# Patient Record
Sex: MALE | Race: OTHER | NOT HISPANIC OR LATINO | Employment: FULL TIME | URBAN - METROPOLITAN AREA
[De-identification: names, ages, dates, MRNs, and addresses within clinical notes are randomized per-mention and may not be internally consistent; named-entity substitution may affect disease eponyms.]

---

## 2024-07-26 ENCOUNTER — HOSPITAL ENCOUNTER (OUTPATIENT)
Dept: RADIOLOGY | Facility: CLINIC | Age: 44
Discharge: HOME | End: 2024-07-26
Payer: COMMERCIAL

## 2024-07-26 DIAGNOSIS — S46.119A RUPTURE OF MUSCLE OF LONG HEAD OF BICEPS: ICD-10-CM

## 2024-07-26 PROCEDURE — 73080 X-RAY EXAM OF ELBOW: CPT | Mod: RT

## 2024-10-17 ENCOUNTER — HOSPITAL ENCOUNTER (OUTPATIENT)
Dept: RADIOLOGY | Facility: CLINIC | Age: 44
Discharge: HOME | End: 2024-10-17
Payer: COMMERCIAL

## 2024-10-17 DIAGNOSIS — S46.211A STRAIN OF MUSCLE, FASCIA AND TENDON OF OTHER PARTS OF BICEPS, RIGHT ARM, INITIAL ENCOUNTER: ICD-10-CM

## 2024-10-17 PROCEDURE — 73221 MRI JOINT UPR EXTREM W/O DYE: CPT | Mod: RT

## 2024-10-29 ENCOUNTER — APPOINTMENT (OUTPATIENT)
Dept: PHYSICAL THERAPY | Facility: CLINIC | Age: 44
End: 2024-10-29

## 2024-11-14 ENCOUNTER — LAB (OUTPATIENT)
Dept: LAB | Facility: LAB | Age: 44
End: 2024-11-14
Payer: COMMERCIAL

## 2024-11-14 ENCOUNTER — PRE-ADMISSION TESTING (OUTPATIENT)
Dept: PREADMISSION TESTING | Facility: HOSPITAL | Age: 44
End: 2024-11-14
Payer: COMMERCIAL

## 2024-11-14 VITALS
BODY MASS INDEX: 32.99 KG/M2 | OXYGEN SATURATION: 100 % | TEMPERATURE: 98.6 F | HEART RATE: 102 BPM | HEIGHT: 66 IN | DIASTOLIC BLOOD PRESSURE: 106 MMHG | SYSTOLIC BLOOD PRESSURE: 157 MMHG | WEIGHT: 205.3 LBS

## 2024-11-14 DIAGNOSIS — Z01.818 PRE-OP EXAMINATION: Primary | ICD-10-CM

## 2024-11-14 DIAGNOSIS — Z01.818 PRE-OP EXAMINATION: ICD-10-CM

## 2024-11-14 LAB
ANION GAP SERPL CALCULATED.3IONS-SCNC: 15 MMOL/L (ref 10–20)
BASOPHILS # BLD AUTO: 0.09 X10*3/UL (ref 0–0.1)
BASOPHILS NFR BLD AUTO: 0.7 %
BUN SERPL-MCNC: 10 MG/DL (ref 6–23)
CALCIUM SERPL-MCNC: 9.9 MG/DL (ref 8.6–10.3)
CHLORIDE SERPL-SCNC: 94 MMOL/L (ref 98–107)
CO2 SERPL-SCNC: 25 MMOL/L (ref 21–32)
CREAT SERPL-MCNC: 1.15 MG/DL (ref 0.5–1.3)
EGFRCR SERPLBLD CKD-EPI 2021: 79 ML/MIN/1.73M*2
EOSINOPHIL # BLD AUTO: 0.12 X10*3/UL (ref 0–0.7)
EOSINOPHIL NFR BLD AUTO: 1 %
ERYTHROCYTE [DISTWIDTH] IN BLOOD BY AUTOMATED COUNT: 11.9 % (ref 11.5–14.5)
GLUCOSE SERPL-MCNC: 193 MG/DL (ref 74–99)
HCT VFR BLD AUTO: 42.1 % (ref 41–52)
HGB BLD-MCNC: 14.5 G/DL (ref 13.5–17.5)
IMM GRANULOCYTES # BLD AUTO: 0.07 X10*3/UL (ref 0–0.7)
IMM GRANULOCYTES NFR BLD AUTO: 0.6 % (ref 0–0.9)
LYMPHOCYTES # BLD AUTO: 1.59 X10*3/UL (ref 1.2–4.8)
LYMPHOCYTES NFR BLD AUTO: 12.6 %
MCH RBC QN AUTO: 31.9 PG (ref 26–34)
MCHC RBC AUTO-ENTMCNC: 34.4 G/DL (ref 32–36)
MCV RBC AUTO: 93 FL (ref 80–100)
MONOCYTES # BLD AUTO: 0.63 X10*3/UL (ref 0.1–1)
MONOCYTES NFR BLD AUTO: 5 %
NEUTROPHILS # BLD AUTO: 10.12 X10*3/UL (ref 1.2–7.7)
NEUTROPHILS NFR BLD AUTO: 80.1 %
NRBC BLD-RTO: 0 /100 WBCS (ref 0–0)
PLATELET # BLD AUTO: 333 X10*3/UL (ref 150–450)
POTASSIUM SERPL-SCNC: 4.1 MMOL/L (ref 3.5–5.3)
RBC # BLD AUTO: 4.54 X10*6/UL (ref 4.5–5.9)
SODIUM SERPL-SCNC: 130 MMOL/L (ref 136–145)
WBC # BLD AUTO: 12.6 X10*3/UL (ref 4.4–11.3)

## 2024-11-14 PROCEDURE — 87081 CULTURE SCREEN ONLY: CPT | Mod: WESLAB

## 2024-11-14 PROCEDURE — 99203 OFFICE O/P NEW LOW 30 MIN: CPT

## 2024-11-14 RX ORDER — METFORMIN HYDROCHLORIDE 500 MG/1
500 TABLET ORAL
COMMUNITY

## 2024-11-14 RX ORDER — LATANOPROST 50 UG/ML
1 SOLUTION/ DROPS OPHTHALMIC NIGHTLY
COMMUNITY

## 2024-11-14 RX ORDER — CHLORHEXIDINE GLUCONATE ORAL RINSE 1.2 MG/ML
SOLUTION DENTAL
Qty: 473 ML | Refills: 0 | Status: SHIPPED | OUTPATIENT
Start: 2024-11-14 | End: 2024-11-27

## 2024-11-14 RX ORDER — OMEPRAZOLE 20 MG/1
20 TABLET, DELAYED RELEASE ORAL
COMMUNITY

## 2024-11-14 RX ORDER — ALLOPURINOL 300 MG/1
300 TABLET ORAL
COMMUNITY

## 2024-11-14 RX ORDER — AMLODIPINE BESYLATE 5 MG/1
5 TABLET ORAL
COMMUNITY

## 2024-11-14 ASSESSMENT — DUKE ACTIVITY SCORE INDEX (DASI)
CAN YOU PARTICIPATE IN MODERATE RECREATIONAL ACTIVITIES LIKE GOLF, BOWLING, DANCING, DOUBLES TENNIS OR THROWING A BASEBALL OR FOOTBALL: NO
CAN YOU WALK A BLOCK OR TWO ON LEVEL GROUND: YES
CAN YOU DO MODERATE WORK AROUND THE HOUSE LIKE VACUUMING, SWEEPING FLOORS OR CARRYING GROCERIES: YES
CAN YOU DO HEAVY WORK AROUND THE HOUSE LIKE SCRUBBING FLOORS OR LIFTING AND MOVING HEAVY FURNITURE: NO
CAN YOU TAKE CARE OF YOURSELF (EAT, DRESS, BATHE, OR USE TOILET): YES
DASI METS SCORE: 6.7
CAN YOU HAVE SEXUAL RELATIONS: YES
TOTAL_SCORE: 32.2
CAN YOU DO LIGHT WORK AROUND THE HOUSE LIKE DUSTING OR WASHING DISHES: YES
CAN YOU CLIMB A FLIGHT OF STAIRS OR WALK UP A HILL: YES
CAN YOU DO YARD WORK LIKE RAKING LEAVES, WEEDING OR PUSHING A MOWER: NO
CAN YOU WALK INDOORS, SUCH AS AROUND YOUR HOUSE: YES
CAN YOU PARTICIPATE IN STRENOUS SPORTS LIKE SWIMMING, SINGLES TENNIS, FOOTBALL, BASKETBALL, OR SKIING: NO
CAN YOU RUN A SHORT DISTANCE: YES

## 2024-11-14 ASSESSMENT — ENCOUNTER SYMPTOMS
MUSCULOSKELETAL NEGATIVE: 1
ENDOCRINE NEGATIVE: 1
CONSTITUTIONAL NEGATIVE: 1
NUMBNESS: 1
RESPIRATORY NEGATIVE: 1
CARDIOVASCULAR NEGATIVE: 1
HEMATOLOGIC/LYMPHATIC NEGATIVE: 1
EYES NEGATIVE: 1
PSYCHIATRIC NEGATIVE: 1
ALLERGIC/IMMUNOLOGIC NEGATIVE: 1
GASTROINTESTINAL NEGATIVE: 1

## 2024-11-14 ASSESSMENT — PAIN SCALES - GENERAL: PAINLEVEL_OUTOF10: 0 - NO PAIN

## 2024-11-14 ASSESSMENT — PAIN - FUNCTIONAL ASSESSMENT: PAIN_FUNCTIONAL_ASSESSMENT: 0-10

## 2024-11-14 NOTE — H&P (VIEW-ONLY)
CPM/PAT Evaluation       Name: Gabriel Castro (Gabriel Castro)  /Age: 1980/44 y.o.     In-Person       Chief Complaint: Right distal bicep tear    HPI: Gabriel Castro is a 44 year old male that has a history of a right distal bicep tear from lifting a heavy box at work. He had his bicep repaired this past August but the surgeon told him his tendon was very shredded and a repeat procedure was necessary. He denies pain or swelling in the right arm. He denies having to take medication for pain.  He states he has some numbness in the forearm after his last procedure. He denies fever, chills, nausea, vomiting, chest pain, sob, dizziness, and palpitations. He is scheduled for a right bicep tendon repair with graft.     Past Medical History:   Diagnosis Date    Arthritis 2010    Gout talke allopurinol 400mg    GERD (gastroesophageal reflux disease) 2019    Take medecine omeprazole    Glaucoma 2018    Use daily eye drops    Hypertension 2017    Just a little high use 5 mg pills    Joint pain 2010    Gout    Type 2 diabetes mellitus 2022    Pre diabetic take metformin       Past Surgical History:   Procedure Laterality Date    OTHER SURGICAL HISTORY  2024    Right bicep tendon repair     Social History     Tobacco Use    Smoking status: Never    Smokeless tobacco: Never   Substance Use Topics    Alcohol use: Yes     Alcohol/week: 8.0 standard drinks of alcohol     Types: 8 Cans of beer per week     Comment: Every other day     Social History     Substance and Sexual Activity   Drug Use Never       Patient  reports that he is not currently sexually active and has had partner(s) who are female. He reports using the following method of birth control/protection: None.    No family history on file.    No Known Allergies  Current Outpatient Medications   Medication Sig Dispense Refill    allopurinol (Zyloprim) 300 mg tablet Take 1 tablet (300 mg) by mouth once daily.      amLODIPine  (Norvasc) 5 mg tablet Take 1 tablet (5 mg) by mouth once daily.      chlorhexidine (Peridex) 0.12 % solution Use as directed 473 mL 0    latanoprost (Xalatan) 0.005 % ophthalmic solution Administer 1 drop into both eyes once daily at bedtime.      metFORMIN (Glucophage) 500 mg tablet Take 1 tablet (500 mg) by mouth once daily.      omeprazole OTC (PriLOSEC OTC) 20 mg EC tablet Take 1 tablet (20 mg) by mouth once daily in the morning. Take before meals.       No current facility-administered medications for this visit.       Review of Systems   Constitutional: Negative.    HENT: Negative.     Eyes: Negative.    Respiratory: Negative.     Cardiovascular: Negative.    Gastrointestinal: Negative.    Endocrine: Negative.    Genitourinary: Negative.    Musculoskeletal: Negative.    Skin: Negative.    Allergic/Immunologic: Negative.    Neurological:  Positive for numbness (tingling lower right arm).   Hematological: Negative.    Psychiatric/Behavioral: Negative.             Physical Exam  Vitals reviewed.   Constitutional:       Appearance: Normal appearance.   HENT:      Head: Normocephalic and atraumatic.      Nose: Nose normal.      Mouth/Throat:      Mouth: Mucous membranes are moist.      Pharynx: Oropharynx is clear.   Eyes:      Extraocular Movements: Extraocular movements intact.      Conjunctiva/sclera: Conjunctivae normal.      Pupils: Pupils are equal, round, and reactive to light.   Cardiovascular:      Rate and Rhythm: Normal rate and regular rhythm.   Pulmonary:      Effort: Pulmonary effort is normal.      Breath sounds: Normal breath sounds.   Abdominal:      General: Bowel sounds are normal.      Palpations: Abdomen is soft.   Genitourinary:     Comments: Assessment deferred to physician    Musculoskeletal:         General: Normal range of motion.      Cervical back: Normal range of motion and neck supple.   Skin:     General: Skin is warm and dry.   Neurological:      General: No focal deficit present.     "  Mental Status: He is alert and oriented to person, place, and time.   Psychiatric:         Mood and Affect: Mood normal.         Behavior: Behavior normal.         Thought Content: Thought content normal.         Judgment: Judgment normal.          PAT AIRWAY:   Airway:     Mallampati::  II    TM distance::  >3 FB    Neck ROM::  Full  normal            BP (!) 157/106   Pulse 102 Comment: recheck  Temp 37 °C (98.6 °F) (Temporal)   Ht 1.676 m (5' 6\")   Wt 93.1 kg (205 lb 4.8 oz)   SpO2 100%   BMI 33.14 kg/m²       ASA: II  WALI: 2.8  RCRI 0.4%  DASI Risk Score      Flowsheet Row Pre-Admission Testing from 11/14/2024 in [facility] Questionnaire Series Submission from 11/4/2024 in [facility] with Generic Provider Tash   Can you take care of yourself (eat, dress, bathe, or use toilet)?  2.75 filed at 11/14/2024 1300 2.75  filed at 11/04/2024 1601   Can you walk indoors, such as around your house? 1.75 filed at 11/14/2024 1300 1.75  filed at 11/04/2024 1601   Can you walk a block or two on level ground?  2.75 filed at 11/14/2024 1300 2.75  filed at 11/04/2024 1601   Can you climb a flight of stairs or walk up a hill? 5.5 filed at 11/14/2024 1300 5.5  filed at 11/04/2024 1601   Can you run a short distance? 8 filed at 11/14/2024 1300 8  filed at 11/04/2024 1601   Can you do light work around the house like dusting or washing dishes? 2.7 filed at 11/14/2024 1300 2.7  filed at 11/04/2024 1601   Can you do moderate work around the house like vacuuming, sweeping floors or carrying groceries? 3.5 filed at 11/14/2024 1300 3.5  filed at 11/04/2024 1601   Can you do heavy work around the house like scrubbing floors or lifting and moving heavy furniture?  0 filed at 11/14/2024 1300 0  filed at 11/04/2024 1601   Can you do yard work like raking leaves, weeding or pushing a mower? 0 filed at 11/14/2024 1300 0  filed at 11/04/2024 1601   Can you have sexual relations? 5.25 filed at 11/14/2024 1300 5.25  " filed at 11/04/2024 1601   Can you participate in moderate recreational activities like golf, bowling, dancing, doubles tennis or throwing a baseball or football? 0 filed at 11/14/2024 1300 0  filed at 11/04/2024 1601   Can you participate in strenous sports like swimming, singles tennis, football, basketball, or skiing? 0 filed at 11/14/2024 1300 0  filed at 11/04/2024 1601   DASI SCORE 32.2 filed at 11/14/2024 1300 32.2  filed at 11/04/2024 1601   METS Score (Will be calculated only when all the questions are answered) 6.7 filed at 11/14/2024 1300 6.7  filed at 11/04/2024 1601          Caprini DVT Assessment    No data to display       Modified Frailty Index    No data to display       CHADS2 Stroke Risk  Current as of 32 minutes ago        N/A 3 to 100%: High Risk   2 to < 3%: Medium Risk   0 to < 2%: Low Risk     Last Change: N/A          This score determines the patient's risk of having a stroke if the patient has atrial fibrillation.        This score is not applicable to this patient. Components are not calculated.          Revised Cardiac Risk Index      Flowsheet Row Pre-Admission Testing from 11/14/2024 in [facility]   High-Risk Surgery (Intraperitoneal, Intrathoracic,Suprainguinal vascular) 0 filed at 11/14/2024 1322   History of ischemic heart disease (History of MI, History of positive exercuse test, Current chest paint considered due to myocardial ischemia, Use of nitrate therapy, ECG with pathological Q Waves) 0 filed at 11/14/2024 1322   History of congestive heart failure (pulmonary edemia, bilateral rales or S3 gallop, Paroxysmal nocturnal dyspnea, CXR showing pulmonary vascular redistribution) 0 filed at 11/14/2024 1322   History of cerebrovascular disease (Prior TIA or stroke) 0 filed at 11/14/2024 1322   Pre-operative insulin treatment 0 filed at 11/14/2024 1322   Pre-operative creatinine>2 mg/dl 0 filed at 11/14/2024 1322   Revised Cardiac Risk Calculator 0 filed at 11/14/2024  1322          Apfel Simplified Score    No data to display       Risk Analysis Index Results This Encounter    No data found in the last 10 encounters.       Stop Bang Score      Flowsheet Row Pre-Admission Testing from 11/14/2024 in [facility] Questionnaire Series Submission from 11/4/2024 in [facility] with Generic Provider Tash   Do you snore loudly? 1 filed at 11/14/2024 1301 1  filed at 11/04/2024 1601   Do you often feel tired or fatigued after your sleep? 0 filed at 11/14/2024 1301 0  filed at 11/04/2024 1601   Has anyone ever observed you stop breathing in your sleep? 0 filed at 11/14/2024 1301 0  filed at 11/04/2024 1601   Do you have or are you being treated for high blood pressure? 1 filed at 11/14/2024 1301 1  filed at 11/04/2024 1601   Recent BMI (Calculated) 33.2 filed at 11/14/2024 1301 33.9  filed at 11/04/2024 1601   Is BMI greater than 35 kg/m2? 0=No filed at 11/14/2024 1301 0=No  filed at 11/04/2024 1601   Age older than 50 years old? 0=No filed at 11/14/2024 1301 0=No  filed at 11/04/2024 1601   Is your neck circumference greater than 17 inches (Male) or 16 inches (Female)? 1 filed at 11/14/2024 1301 --   Gender - Male 1=Yes filed at 11/14/2024 1301 1=Yes  filed at 11/04/2024 1601   STOP-BANG Total Score 4 filed at 11/14/2024 1301 --          Prodigy: High Risk  Total Score: 8              Prodigy Gender Score          ARISCAT Score for Postoperative Pulmonary Complications    No data to display       Avilez Perioperative Risk for Myocardial Infarction or Cardiac Arrest (RASHAWN)    No data to display         Assessment and Plan:     Rupture of right distal biceps tendon, initial encounter : Repair Tendon with or without Tendon Graft Upper Extremity   Hypertension  Gout  GERD  ETOH: 30 beers a week  BMI: 33.14    LABS: MRSA, CBC, BMP collected in [facility]

## 2024-11-14 NOTE — CPM/PAT H&P
CPM/PAT Evaluation       Name: Gabriel Castro (Gabriel Castro)  /Age: 1980/44 y.o.     In-Person       Chief Complaint: Right distal bicep tear    HPI: Gabriel Castro is a 44 year old male that has a history of a right distal bicep tear from lifting a heavy box at work. He had his bicep repaired this past August but the surgeon told him his tendon was very shredded and a repeat procedure was necessary. He denies pain or swelling in the right arm. He denies having to take medication for pain.  He states he has some numbness in the forearm after his last procedure. He denies fever, chills, nausea, vomiting, chest pain, sob, dizziness, and palpitations. He is scheduled for a right bicep tendon repair with graft.     Past Medical History:   Diagnosis Date    Arthritis 2010    Gout talke allopurinol 400mg    GERD (gastroesophageal reflux disease) 2019    Take medecine omeprazole    Glaucoma 2018    Use daily eye drops    Hypertension 2017    Just a little high use 5 mg pills    Joint pain 2010    Gout    Type 2 diabetes mellitus 2022    Pre diabetic take metformin       Past Surgical History:   Procedure Laterality Date    OTHER SURGICAL HISTORY  2024    Right bicep tendon repair     Social History     Tobacco Use    Smoking status: Never    Smokeless tobacco: Never   Substance Use Topics    Alcohol use: Yes     Alcohol/week: 8.0 standard drinks of alcohol     Types: 8 Cans of beer per week     Comment: Every other day     Social History     Substance and Sexual Activity   Drug Use Never       Patient  reports that he is not currently sexually active and has had partner(s) who are female. He reports using the following method of birth control/protection: None.    No family history on file.    No Known Allergies  Current Outpatient Medications   Medication Sig Dispense Refill    allopurinol (Zyloprim) 300 mg tablet Take 1 tablet (300 mg) by mouth once daily.      amLODIPine  (Norvasc) 5 mg tablet Take 1 tablet (5 mg) by mouth once daily.      chlorhexidine (Peridex) 0.12 % solution Use as directed 473 mL 0    latanoprost (Xalatan) 0.005 % ophthalmic solution Administer 1 drop into both eyes once daily at bedtime.      metFORMIN (Glucophage) 500 mg tablet Take 1 tablet (500 mg) by mouth once daily.      omeprazole OTC (PriLOSEC OTC) 20 mg EC tablet Take 1 tablet (20 mg) by mouth once daily in the morning. Take before meals.       No current facility-administered medications for this visit.       Review of Systems   Constitutional: Negative.    HENT: Negative.     Eyes: Negative.    Respiratory: Negative.     Cardiovascular: Negative.    Gastrointestinal: Negative.    Endocrine: Negative.    Genitourinary: Negative.    Musculoskeletal: Negative.    Skin: Negative.    Allergic/Immunologic: Negative.    Neurological:  Positive for numbness (tingling lower right arm).   Hematological: Negative.    Psychiatric/Behavioral: Negative.             Physical Exam  Vitals reviewed.   Constitutional:       Appearance: Normal appearance.   HENT:      Head: Normocephalic and atraumatic.      Nose: Nose normal.      Mouth/Throat:      Mouth: Mucous membranes are moist.      Pharynx: Oropharynx is clear.   Eyes:      Extraocular Movements: Extraocular movements intact.      Conjunctiva/sclera: Conjunctivae normal.      Pupils: Pupils are equal, round, and reactive to light.   Cardiovascular:      Rate and Rhythm: Normal rate and regular rhythm.   Pulmonary:      Effort: Pulmonary effort is normal.      Breath sounds: Normal breath sounds.   Abdominal:      General: Bowel sounds are normal.      Palpations: Abdomen is soft.   Genitourinary:     Comments: Assessment deferred to physician    Musculoskeletal:         General: Normal range of motion.      Cervical back: Normal range of motion and neck supple.   Skin:     General: Skin is warm and dry.   Neurological:      General: No focal deficit present.     "  Mental Status: He is alert and oriented to person, place, and time.   Psychiatric:         Mood and Affect: Mood normal.         Behavior: Behavior normal.         Thought Content: Thought content normal.         Judgment: Judgment normal.          PAT AIRWAY:   Airway:     Mallampati::  II    TM distance::  >3 FB    Neck ROM::  Full  normal            BP (!) 157/106   Pulse 102 Comment: recheck  Temp 37 °C (98.6 °F) (Temporal)   Ht 1.676 m (5' 6\")   Wt 93.1 kg (205 lb 4.8 oz)   SpO2 100%   BMI 33.14 kg/m²       ASA: II  WALI: 2.8  RCRI 0.4%  DASI Risk Score      Flowsheet Row Pre-Admission Testing from 11/14/2024 in [facility] Questionnaire Series Submission from 11/4/2024 in [facility] with Generic Provider Tash   Can you take care of yourself (eat, dress, bathe, or use toilet)?  2.75 filed at 11/14/2024 1300 2.75  filed at 11/04/2024 1601   Can you walk indoors, such as around your house? 1.75 filed at 11/14/2024 1300 1.75  filed at 11/04/2024 1601   Can you walk a block or two on level ground?  2.75 filed at 11/14/2024 1300 2.75  filed at 11/04/2024 1601   Can you climb a flight of stairs or walk up a hill? 5.5 filed at 11/14/2024 1300 5.5  filed at 11/04/2024 1601   Can you run a short distance? 8 filed at 11/14/2024 1300 8  filed at 11/04/2024 1601   Can you do light work around the house like dusting or washing dishes? 2.7 filed at 11/14/2024 1300 2.7  filed at 11/04/2024 1601   Can you do moderate work around the house like vacuuming, sweeping floors or carrying groceries? 3.5 filed at 11/14/2024 1300 3.5  filed at 11/04/2024 1601   Can you do heavy work around the house like scrubbing floors or lifting and moving heavy furniture?  0 filed at 11/14/2024 1300 0  filed at 11/04/2024 1601   Can you do yard work like raking leaves, weeding or pushing a mower? 0 filed at 11/14/2024 1300 0  filed at 11/04/2024 1601   Can you have sexual relations? 5.25 filed at 11/14/2024 1300 5.25  " filed at 11/04/2024 1601   Can you participate in moderate recreational activities like golf, bowling, dancing, doubles tennis or throwing a baseball or football? 0 filed at 11/14/2024 1300 0  filed at 11/04/2024 1601   Can you participate in strenous sports like swimming, singles tennis, football, basketball, or skiing? 0 filed at 11/14/2024 1300 0  filed at 11/04/2024 1601   DASI SCORE 32.2 filed at 11/14/2024 1300 32.2  filed at 11/04/2024 1601   METS Score (Will be calculated only when all the questions are answered) 6.7 filed at 11/14/2024 1300 6.7  filed at 11/04/2024 1601          Caprini DVT Assessment    No data to display       Modified Frailty Index    No data to display       CHADS2 Stroke Risk  Current as of 32 minutes ago        N/A 3 to 100%: High Risk   2 to < 3%: Medium Risk   0 to < 2%: Low Risk     Last Change: N/A          This score determines the patient's risk of having a stroke if the patient has atrial fibrillation.        This score is not applicable to this patient. Components are not calculated.          Revised Cardiac Risk Index      Flowsheet Row Pre-Admission Testing from 11/14/2024 in [facility]   High-Risk Surgery (Intraperitoneal, Intrathoracic,Suprainguinal vascular) 0 filed at 11/14/2024 1322   History of ischemic heart disease (History of MI, History of positive exercuse test, Current chest paint considered due to myocardial ischemia, Use of nitrate therapy, ECG with pathological Q Waves) 0 filed at 11/14/2024 1322   History of congestive heart failure (pulmonary edemia, bilateral rales or S3 gallop, Paroxysmal nocturnal dyspnea, CXR showing pulmonary vascular redistribution) 0 filed at 11/14/2024 1322   History of cerebrovascular disease (Prior TIA or stroke) 0 filed at 11/14/2024 1322   Pre-operative insulin treatment 0 filed at 11/14/2024 1322   Pre-operative creatinine>2 mg/dl 0 filed at 11/14/2024 1322   Revised Cardiac Risk Calculator 0 filed at 11/14/2024  1322          Apfel Simplified Score    No data to display       Risk Analysis Index Results This Encounter    No data found in the last 10 encounters.       Stop Bang Score      Flowsheet Row Pre-Admission Testing from 11/14/2024 in [facility] Questionnaire Series Submission from 11/4/2024 in [facility] with Generic Provider Tash   Do you snore loudly? 1 filed at 11/14/2024 1301 1  filed at 11/04/2024 1601   Do you often feel tired or fatigued after your sleep? 0 filed at 11/14/2024 1301 0  filed at 11/04/2024 1601   Has anyone ever observed you stop breathing in your sleep? 0 filed at 11/14/2024 1301 0  filed at 11/04/2024 1601   Do you have or are you being treated for high blood pressure? 1 filed at 11/14/2024 1301 1  filed at 11/04/2024 1601   Recent BMI (Calculated) 33.2 filed at 11/14/2024 1301 33.9  filed at 11/04/2024 1601   Is BMI greater than 35 kg/m2? 0=No filed at 11/14/2024 1301 0=No  filed at 11/04/2024 1601   Age older than 50 years old? 0=No filed at 11/14/2024 1301 0=No  filed at 11/04/2024 1601   Is your neck circumference greater than 17 inches (Male) or 16 inches (Female)? 1 filed at 11/14/2024 1301 --   Gender - Male 1=Yes filed at 11/14/2024 1301 1=Yes  filed at 11/04/2024 1601   STOP-BANG Total Score 4 filed at 11/14/2024 1301 --          Prodigy: High Risk  Total Score: 8              Prodigy Gender Score          ARISCAT Score for Postoperative Pulmonary Complications    No data to display       Avilez Perioperative Risk for Myocardial Infarction or Cardiac Arrest (RASHAWN)    No data to display         Assessment and Plan:     Rupture of right distal biceps tendon, initial encounter : Repair Tendon with or without Tendon Graft Upper Extremity   Hypertension  Gout  GERD  ETOH: 30 beers a week  BMI: 33.14    LABS: MRSA, CBC, BMP collected in [facility]

## 2024-11-14 NOTE — PREPROCEDURE INSTRUCTIONS
Medication List            Accurate as of November 14, 2024  1:02 PM. Always use your most recent med list.                allopurinol 300 mg tablet  Commonly known as: Zyloprim  Medication Adjustments for Surgery: Take on the morning of surgery     amLODIPine 5 mg tablet  Commonly known as: Norvasc  Medication Adjustments for Surgery: Take on the morning of surgery     latanoprost 0.005 % ophthalmic solution  Commonly known as: Xalatan  Medication Adjustments for Surgery: Take/Use as prescribed     metFORMIN 500 mg tablet  Commonly known as: Glucophage  Medication Adjustments for Surgery: Do Not take on the morning of surgery     omeprazole OTC 20 mg EC tablet  Commonly known as: PriLOSEC OTC  Medication Adjustments for Surgery: Take on the morning of surgery                 Preoperative Fasting Guidelines    Why must I stop eating and drinking near surgery time?  With sedation, food or liquid in your stomach can enter your lungs causing serious complications  Increases nausea and vomiting    When do I need to stop eating and drinking before my surgery?  Do not eat any food or drink any liquids after midnight the night before your surgery/procedure.  You may have sips of water to take medications.    PAT DISCHARGE INSTRUCTIONS    Please call the Same Day Surgery (SDS) Department of the hospital where your procedure will be performed after 2:00 PM the day before your surgery. If you are scheduled on a Monday, or a Tuesday following a Monday holiday, you will need to call on the last business day prior to your surgery.    [facility]  [address] [place] [address] [phone]  [facility]  [address] [place] [address], [address] [phone]  [place] [address] [facility] [place] [address]  [phone]    Please let your surgeon know if:      You develop any  open sores, shingles, burning or painful urination as these may increase your risk of an infection.   You no longer wish to have the surgery.   Any other personal circumstances change that may lead to the need to cancel or defer this surgery-such as being sick or getting admitted to any hospital within one week of your planned procedure.    Your contact details change, such as a change of address or phone number.    Starting now:     Please DO NOT drink alcohol or smoke for 24 hours before surgery. It is well known that quitting smoking can make a huge difference to your health and recovery from surgery. The longer you abstain from smoking, the better your chances of a healthy recovery. If you need help with quitting, call 3-800QUIT-NOW to be connected to a trained counselor who will discuss the best methods to help you quit.     Before your surgery:    Please stop all supplements 7 days prior to surgery. Or as directed by your surgeon.   Please stop taking NSAID pain medicine such as Advil and Motrin 7 days before surgery.    If you develop any fever, cough, cold, rashes, cuts, scratches, scrapes, urinary symptoms or infection anywhere on your body (including teeth and gums) prior to surgery, please call your surgeon’s office as soon as possible. This may require treatment to reduce the chance of cancellation on the day of surgery.    The day before your surgery:   DIET- Please follow the diet instructions at the top of your packet.   Get a good night’s rest.  Use the special soap for bathing if you have been instructed to use one.    Scheduled surgery times may change and you will be notified if this occurs - please check your personal voicemail for any updates.     On the morning of surgery:   Wear comfortable, loose fitting clothes which open in the front. Please do not wear moisturizers, creams, lotions, makeup or perfume.    Please bring with you to surgery:   Photo ID and insurance card   Current list of  medicines and allergies   Pacemaker/ Defibrillator/Heart stent cards   CPAP machine and mask    Slings/ splints/ crutches   A copy of your complete advanced directive/DHPOA.    Please do NOT bring with you to surgery:   All jewelry and valuables should be left at home.   Prosthetic devices such as contact lenses, hearing aids, dentures, eyelash extensions, hairpins and body piercings must be removed prior to going in to the surgical suite.    After outpatient surgery:   A responsible adult MUST accompany you at the time of discharge and stay with you for 24 hours after your surgery. You may NOT drive yourself home after surgery.    Do not drive, operate machinery, make critical decisions or do activities that require co-ordination or balance until after a night’s sleep.   Do not drink alcoholic beverages for 24 hours.   Instructions for resuming your medications will be provided by your surgeon.    CALL YOUR DOCTOR AFTER SURGERY IF YOU HAVE:     Chills and/or a fever of 101° F or higher.    Redness, swelling, pus or drainage from your surgical wound or a bad smell from the wound.    Lightheadedness, fainting or confusion.    Persistent vomiting (throwing up) and are not able to eat or drink for 12 hours.    Three or more loose, watery bowel movements in 24 hours (diarrhea).   Difficulty or pain while urinating( after non-urological surgery)    Pain and swelling in your legs, especially if it is only on one side.    Difficulty breathing or are breathing faster than normal.    Any new concerning symptoms.      Patient Information: Pre-Operative Infection Prevention Measures     Why did I have my nose, under my arms, and groin swabbed?  The purpose of the swab is to identify Staphylococcus aureus inside your nose or on your skin.  The swab was sent to the laboratory for culture.  A positive swab/culture for Staphylococcus aureus is called colonization or carriage.      What is Staphylococcus aureus?  Staphylococcus  aureus, also known as “staph”, is a germ found on the skin or in the nose of healthy people.  Sometimes Staphylococcus aureus can get into the body and cause an infection.  This can be minor (such as pimples, boils, or other skin problems).  It might also be serious (such as a blood infection, pneumonia, or a surgical site infection).    What is Staphylococcus aureus colonization or carriage?  Colonization or carriage means that a person has the germ but is not sick from it.  These bacteria can be spread on the hands or when breathing or sneezing.    How is Staphylococcus aureus spread?  It is most often spread by close contact with a person or item that carries it.    What happens if my culture is positive for Staphylococcus aureus?  Your doctor/medical team will use this information to guide any antibiotic treatment which may be necessary.  Regardless of the culture results, we will clean the inside of your nose with a betadine swab just before you have your surgery.      Will I get an infection if I have Staphylococcus aureus in my nose or on my skin?  Anyone can get an infection with Staphylococcus aureus.  However, the best way to reduce your risk of infection is to follow the instructions provided to you for the use of your CHG soap and dental rinse.        Patient Information: Oral/Dental Rinse    What is oral/dental rinse?   It is a mouthwash. It is a way of cleaning the mouth with a germ-killing solution before your surgery.  The solution contains chlorhexidine, commonly known as CHG.   It is used inside the mouth to kill a bacteria known as Staphylococcus aureus.  Let your doctor know if you are allergic to Chlorhexidine.    Why do I need to use CHG oral/dental rinse?  The CHG oral/dental rinse helps to kill a bacteria in your mouth known as Staphylococcus aureus.     This reduces the risk of infection at the surgical site.      Using your CHG oral/dental rinse  STEPS:  Use your CHG oral/dental rinse after  you brush your teeth the night before (at bedtime) and the morning of your surgery.  Follow all directions on your prescription label.    Use the cap on the container to measure 15ml   Swish (gargle if you can) the mouthwash in your mouth for at least 30 seconds, (do not swallow) and spit out  After you use your CHG rinse, do not rinse your mouth with water, drink or eat.  Please refer to the prescription label for the appropriate time to resume oral intake      What side effects might I have using the CHG oral/dental rinse?  CHG rinse will stick to plaque on the teeth.  Brush and floss just before use.  Teeth brushing will help avoid staining of plaque during use.      Patient Information: Home Preoperative Antibacterial Shower      What is a home preoperative antibacterial shower?  This shower is a way of cleaning the skin with a germ-killing solution before surgery.  The solution contains chlorhexidine, commonly known as CHG.  CHG is a skin cleanser with germ-killing ability.  Let your doctor know if you are allergic to chlorhexidine.    Why do I need to take a preoperative antibacterial shower?  Skin is not sterile.  It is best to try to make your skin as free of germs as possible before surgery.  Proper cleansing with a germ-killing soap before surgery can lower the number of germs on your skin.  This helps to reduce the risk of infection at the surgical site.  Following the instructions listed below will help you prepare your skin for surgery.      How do I use the solution?  Steps:  Begin using your CHG soap 5 days before your scheduled surgery on _____11/23/24_____.    First, wash and rinse your hair using the CHG soap. Keep CHG soap away from ear canals and eyes.  Rinse completely, do not condition.  Hair extensions should be removed.  Wash your face with your normal soap and rinse.    Apply the CHG solution to a clean wet washcloth.  Turn the water off or move away from the water spray to avoid premature  rinsing of the CHG soap as you are applying.   Firmly lather your entire body from the neck down.  Do not use on your face.  Pay special attention to the area(s) where your incision(s) will be located unless they are on your face.  Avoid scrubbing your skin too hard.  The important point is to have the CHG soap sit on your skin for 3 minutes.    When the 3 minutes are up, turn on the water and rinse the CHG solution off your body completely.   DO NOT wash with regular soap after you have used the CHG soap solution  Pat yourself dry with a clean, freshly-laundered towel.  DO NOT apply powders, deodorants, or lotions.  Dress in clean, freshly laundered nightclothes.    Be sure to sleep with clean, freshly laundered sheets.  Be aware that CHG will cause stains on fabrics; if you wash them with bleach after use.  Rinse your washcloth and other linens that have contact with CHG completely.  Use only non-chlorine detergents to launder the items used.   The morning of surgery is the fifth day.  Repeat the above steps and dress in clean comfortable clothing     Whom should I contact if I have any questions regarding the use of CHG soap?  Call the [facility] at [phone] if you have any questions.

## 2024-11-16 LAB — STAPHYLOCOCCUS SPEC CULT: ABNORMAL

## 2024-11-19 ENCOUNTER — APPOINTMENT (OUTPATIENT)
Dept: PHYSICAL THERAPY | Facility: CLINIC | Age: 44
End: 2024-11-19

## 2024-11-20 ENCOUNTER — APPOINTMENT (OUTPATIENT)
Dept: PREADMISSION TESTING | Facility: HOSPITAL | Age: 44
End: 2024-11-20
Payer: COMMERCIAL

## 2024-11-27 ENCOUNTER — HOSPITAL ENCOUNTER (OUTPATIENT)
Facility: HOSPITAL | Age: 44
Setting detail: OUTPATIENT SURGERY
Discharge: HOME | End: 2024-11-27
Attending: ORTHOPAEDIC SURGERY | Admitting: ORTHOPAEDIC SURGERY
Payer: COMMERCIAL

## 2024-11-27 ENCOUNTER — PHARMACY VISIT (OUTPATIENT)
Dept: PHARMACY | Facility: CLINIC | Age: 44
End: 2024-11-27
Payer: COMMERCIAL

## 2024-11-27 ENCOUNTER — ANESTHESIA (OUTPATIENT)
Dept: OPERATING ROOM | Facility: HOSPITAL | Age: 44
End: 2024-11-27
Payer: COMMERCIAL

## 2024-11-27 ENCOUNTER — APPOINTMENT (OUTPATIENT)
Dept: RADIOLOGY | Facility: HOSPITAL | Age: 44
End: 2024-11-27
Payer: COMMERCIAL

## 2024-11-27 ENCOUNTER — ANESTHESIA EVENT (OUTPATIENT)
Dept: OPERATING ROOM | Facility: HOSPITAL | Age: 44
End: 2024-11-27
Payer: COMMERCIAL

## 2024-11-27 VITALS
SYSTOLIC BLOOD PRESSURE: 141 MMHG | WEIGHT: 205.25 LBS | RESPIRATION RATE: 22 BRPM | HEART RATE: 71 BPM | DIASTOLIC BLOOD PRESSURE: 89 MMHG | TEMPERATURE: 96.8 F | BODY MASS INDEX: 33.13 KG/M2 | OXYGEN SATURATION: 94 %

## 2024-11-27 DIAGNOSIS — S46.211A TRAUMATIC PARTIAL TEAR OF RIGHT BICEPS TENDON, INITIAL ENCOUNTER: Primary | ICD-10-CM

## 2024-11-27 LAB — GLUCOSE BLD MANUAL STRIP-MCNC: 154 MG/DL (ref 74–99)

## 2024-11-27 PROCEDURE — 7100000001 HC RECOVERY ROOM TIME - INITIAL BASE CHARGE: Performed by: ORTHOPAEDIC SURGERY

## 2024-11-27 PROCEDURE — 7100000009 HC PHASE TWO TIME - INITIAL BASE CHARGE: Performed by: ORTHOPAEDIC SURGERY

## 2024-11-27 PROCEDURE — 7100000010 HC PHASE TWO TIME - EACH INCREMENTAL 1 MINUTE: Performed by: ORTHOPAEDIC SURGERY

## 2024-11-27 PROCEDURE — 2500000005 HC RX 250 GENERAL PHARMACY W/O HCPCS: Performed by: ORTHOPAEDIC SURGERY

## 2024-11-27 PROCEDURE — 2500000004 HC RX 250 GENERAL PHARMACY W/ HCPCS (ALT 636 FOR OP/ED): Performed by: ANESTHESIOLOGY

## 2024-11-27 PROCEDURE — 7100000002 HC RECOVERY ROOM TIME - EACH INCREMENTAL 1 MINUTE: Performed by: ORTHOPAEDIC SURGERY

## 2024-11-27 PROCEDURE — 3700000002 HC GENERAL ANESTHESIA TIME - EACH INCREMENTAL 1 MINUTE: Performed by: ORTHOPAEDIC SURGERY

## 2024-11-27 PROCEDURE — C1713 ANCHOR/SCREW BN/BN,TIS/BN: HCPCS | Performed by: ORTHOPAEDIC SURGERY

## 2024-11-27 PROCEDURE — RXMED WILLOW AMBULATORY MEDICATION CHARGE

## 2024-11-27 PROCEDURE — 2720000007 HC OR 272 NO HCPCS: Performed by: ORTHOPAEDIC SURGERY

## 2024-11-27 PROCEDURE — 82947 ASSAY GLUCOSE BLOOD QUANT: CPT

## 2024-11-27 PROCEDURE — 76000 FLUOROSCOPY <1 HR PHYS/QHP: CPT

## 2024-11-27 PROCEDURE — 3700000001 HC GENERAL ANESTHESIA TIME - INITIAL BASE CHARGE: Performed by: ORTHOPAEDIC SURGERY

## 2024-11-27 PROCEDURE — 2500000004 HC RX 250 GENERAL PHARMACY W/ HCPCS (ALT 636 FOR OP/ED): Performed by: ORTHOPAEDIC SURGERY

## 2024-11-27 PROCEDURE — 3600000008 HC OR TIME - EACH INCREMENTAL 1 MINUTE - PROCEDURE LEVEL THREE: Performed by: ORTHOPAEDIC SURGERY

## 2024-11-27 PROCEDURE — C1762 CONN TISS, HUMAN(INC FASCIA): HCPCS | Performed by: ORTHOPAEDIC SURGERY

## 2024-11-27 PROCEDURE — 3600000003 HC OR TIME - INITIAL BASE CHARGE - PROCEDURE LEVEL THREE: Performed by: ORTHOPAEDIC SURGERY

## 2024-11-27 PROCEDURE — 2780000003 HC OR 278 NO HCPCS: Performed by: ORTHOPAEDIC SURGERY

## 2024-11-27 DEVICE — IMPLANTABLE DEVICE: Type: IMPLANTABLE DEVICE | Site: ARM | Status: FUNCTIONAL

## 2024-11-27 RX ORDER — SODIUM CHLORIDE 0.9 G/100ML
IRRIGANT IRRIGATION AS NEEDED
Status: DISCONTINUED | OUTPATIENT
Start: 2024-11-27 | End: 2024-11-27 | Stop reason: HOSPADM

## 2024-11-27 RX ORDER — ALBUTEROL SULFATE 0.83 MG/ML
2.5 SOLUTION RESPIRATORY (INHALATION) ONCE AS NEEDED
Status: CANCELLED | OUTPATIENT
Start: 2024-11-27

## 2024-11-27 RX ORDER — KETOROLAC TROMETHAMINE 30 MG/ML
INJECTION, SOLUTION INTRAMUSCULAR; INTRAVENOUS AS NEEDED
Status: DISCONTINUED | OUTPATIENT
Start: 2024-11-27 | End: 2024-11-27

## 2024-11-27 RX ORDER — CEFAZOLIN 1 G/1
INJECTION, POWDER, FOR SOLUTION INTRAVENOUS AS NEEDED
Status: DISCONTINUED | OUTPATIENT
Start: 2024-11-27 | End: 2024-11-27

## 2024-11-27 RX ORDER — ONDANSETRON HYDROCHLORIDE 2 MG/ML
4 INJECTION, SOLUTION INTRAVENOUS ONCE AS NEEDED
Status: CANCELLED | OUTPATIENT
Start: 2024-11-27

## 2024-11-27 RX ORDER — HYDRALAZINE HYDROCHLORIDE 20 MG/ML
5 INJECTION INTRAMUSCULAR; INTRAVENOUS EVERY 30 MIN PRN
Status: CANCELLED | OUTPATIENT
Start: 2024-11-27

## 2024-11-27 RX ORDER — FENTANYL CITRATE 50 UG/ML
50 INJECTION, SOLUTION INTRAMUSCULAR; INTRAVENOUS ONCE AS NEEDED
Status: COMPLETED | OUTPATIENT
Start: 2024-11-27 | End: 2024-11-27

## 2024-11-27 RX ORDER — OXYCODONE AND ACETAMINOPHEN 5; 325 MG/1; MG/1
1 TABLET ORAL EVERY 6 HOURS PRN
Qty: 20 TABLET | Refills: 0 | Status: SHIPPED | OUTPATIENT
Start: 2024-11-27

## 2024-11-27 RX ORDER — SODIUM CHLORIDE, SODIUM LACTATE, POTASSIUM CHLORIDE, CALCIUM CHLORIDE 600; 310; 30; 20 MG/100ML; MG/100ML; MG/100ML; MG/100ML
100 INJECTION, SOLUTION INTRAVENOUS CONTINUOUS
Status: CANCELLED | OUTPATIENT
Start: 2024-11-27 | End: 2024-11-27

## 2024-11-27 RX ORDER — MEPERIDINE HYDROCHLORIDE 25 MG/ML
12.5 INJECTION INTRAMUSCULAR; INTRAVENOUS; SUBCUTANEOUS EVERY 10 MIN PRN
Status: CANCELLED | OUTPATIENT
Start: 2024-11-27

## 2024-11-27 RX ORDER — OXYCODONE AND ACETAMINOPHEN 5; 325 MG/1; MG/1
1 TABLET ORAL EVERY 6 HOURS
Status: DISCONTINUED | OUTPATIENT
Start: 2024-11-27 | End: 2024-11-27 | Stop reason: HOSPADM

## 2024-11-27 RX ORDER — FENTANYL CITRATE 50 UG/ML
50 INJECTION, SOLUTION INTRAMUSCULAR; INTRAVENOUS EVERY 5 MIN PRN
Status: CANCELLED | OUTPATIENT
Start: 2024-11-27

## 2024-11-27 RX ORDER — PROPOFOL 10 MG/ML
INJECTION, EMULSION INTRAVENOUS AS NEEDED
Status: DISCONTINUED | OUTPATIENT
Start: 2024-11-27 | End: 2024-11-27

## 2024-11-27 RX ORDER — LIDOCAINE HYDROCHLORIDE 10 MG/ML
0.1 INJECTION, SOLUTION INFILTRATION; PERINEURAL ONCE
Status: CANCELLED | OUTPATIENT
Start: 2024-11-27 | End: 2024-11-27

## 2024-11-27 RX ORDER — VANCOMYCIN HYDROCHLORIDE 1 G/20ML
INJECTION, POWDER, LYOPHILIZED, FOR SOLUTION INTRAVENOUS AS NEEDED
Status: DISCONTINUED | OUTPATIENT
Start: 2024-11-27 | End: 2024-11-27 | Stop reason: HOSPADM

## 2024-11-27 RX ORDER — FENTANYL CITRATE 50 UG/ML
INJECTION, SOLUTION INTRAMUSCULAR; INTRAVENOUS AS NEEDED
Status: DISCONTINUED | OUTPATIENT
Start: 2024-11-27 | End: 2024-11-27

## 2024-11-27 RX ORDER — CEFAZOLIN SODIUM 2 G/100ML
2 INJECTION, SOLUTION INTRAVENOUS ONCE
Status: DISCONTINUED | OUTPATIENT
Start: 2024-11-27 | End: 2024-11-27 | Stop reason: HOSPADM

## 2024-11-27 RX ORDER — MIDAZOLAM HYDROCHLORIDE 1 MG/ML
2 INJECTION, SOLUTION INTRAMUSCULAR; INTRAVENOUS ONCE
Status: COMPLETED | OUTPATIENT
Start: 2024-11-27 | End: 2024-11-27

## 2024-11-27 RX ORDER — IBUPROFEN 600 MG/1
600 TABLET ORAL 4 TIMES DAILY PRN
Qty: 30 TABLET | Refills: 0 | Status: SHIPPED | OUTPATIENT
Start: 2024-11-27

## 2024-11-27 RX ORDER — MIDAZOLAM HYDROCHLORIDE 1 MG/ML
1 INJECTION, SOLUTION INTRAMUSCULAR; INTRAVENOUS ONCE AS NEEDED
Status: CANCELLED | OUTPATIENT
Start: 2024-11-27

## 2024-11-27 SDOH — HEALTH STABILITY: MENTAL HEALTH: CURRENT SMOKER: 0

## 2024-11-27 ASSESSMENT — PAIN - FUNCTIONAL ASSESSMENT
PAIN_FUNCTIONAL_ASSESSMENT: 0-10

## 2024-11-27 ASSESSMENT — PAIN SCALES - GENERAL
PAINLEVEL_OUTOF10: 0 - NO PAIN
PAINLEVEL_OUTOF10: 0 - NO PAIN
PAIN_LEVEL: 2
PAINLEVEL_OUTOF10: 0 - NO PAIN

## 2024-11-27 ASSESSMENT — COLUMBIA-SUICIDE SEVERITY RATING SCALE - C-SSRS
1. IN THE PAST MONTH, HAVE YOU WISHED YOU WERE DEAD OR WISHED YOU COULD GO TO SLEEP AND NOT WAKE UP?: NO
2. HAVE YOU ACTUALLY HAD ANY THOUGHTS OF KILLING YOURSELF?: NO
6. HAVE YOU EVER DONE ANYTHING, STARTED TO DO ANYTHING, OR PREPARED TO DO ANYTHING TO END YOUR LIFE?: NO

## 2024-11-27 NOTE — ANESTHESIA PREPROCEDURE EVALUATION
Patient: Gabriel Castro    Procedure Information       Date/Time: 11/27/24 0945    Procedure: Repair Tendon with or without Tendon Graft Upper Extremity (Right: Arm Upper)    Location: [facility] / [facility]    Surgeons: Waldo Ureña MD            Relevant Problems   No relevant active problems       Clinical information reviewed:   Tobacco  Allergies  Meds   Med Hx  Surg Hx   Fam Hx  Soc Hx        NPO Detail:  NPO/Void Status  Carbohydrate Drink Given Prior to Surgery? : N  Date of Last Liquid: 11/27/24  Time of Last Liquid: 0650  Date of Last Solid: 11/26/24  Time of Last Solid: 2100  Last Intake Type: Clear fluids (sip of water with am meds)  Time of Last Void: 0700         Physical Exam    Airway  Mallampati: II  TM distance: >3 FB  Neck ROM: full     Cardiovascular - normal exam     Dental - normal exam     Pulmonary - normal exam     Abdominal - normal exam             Anesthesia Plan    History of general anesthesia?: yes  History of complications of general anesthesia?: no    ASA 2     general     The patient is not a current smoker.  Patient was not previously instructed to abstain from smoking on day of procedure.  Patient did not smoke on day of procedure.  Education provided regarding risk of obstructive sleep apnea.  intravenous induction   Postoperative administration of opioids is intended.  Trial extubation is planned.  Anesthetic plan and risks discussed with patient.  Use of blood products discussed with patient who consented to blood products.    Plan discussed with CAA, attending and CRNA.

## 2024-11-27 NOTE — POST-PROCEDURE NOTE
PT ARRIVED IN PHASE II.  BREATHING REGULAR AND  NON LABORED.  PT DENIES PAIN, NAUSEA OR DIZZINESS.  DRESSING CLEAN, DRY AND INTACT WITHOUT SHADOWING.  Sling and ice pack in place.

## 2024-11-27 NOTE — OP NOTE
Repair Tendon with or without Tendon Graft Upper Extremity (R) Operative Note     Date: 2024  OR Location: [facility]    Name: Gabriel Castro, : 1980, Age: 44 y.o., MRN: [id number], Sex: male    Diagnosis  Pre-op Diagnosis      * Rupture of right distal biceps tendon, initial encounter [S46.211A] Post-op Diagnosis     * Rupture of right distal biceps tendon, initial encounter [S4A]     Procedures  Repair Tendon with or without Tendon Graft Upper Extremity  [address] - [place] RINSJ RPTD BICEPS/TRICEPS TDN DSTL W/WO TDN GRF      Surgeons      * Waldo Ureña - Primary    Resident/Fellow/Other Assistant:  Surgeons and Role:  * No surgeons found with a matching role *  Dali RENTERIA    Staff:   Surgical Assistant:   Circulator: Tom  Circulator: Clover Mercado Person: Beryl  Circulator: Maday Mercado Person: Julianne    Anesthesia Staff: Anesthesiologist: Silverio Munoz MD    Procedure Summary  Anesthesia: General  ASA: II  Estimated Blood Loss: 15mL  Intra-op Medications:   Administrations occurring from 0945 to 1145 on 24:   Medication Name Total Dose   sodium chloride 0.9 % irrigation solution 1,000 mL   ceFAZolin (Ancef) vial 1 g 2 g   dexAMETHasone (Decadron) 4 mg/mL 4 mg   fentaNYL PF 0.05 mg/mL 75 mcg   propofol (Diprivan) injection 10 mg/mL 200 mg              Anesthesia Record               Intraprocedure I/O Totals       None           Specimen: No specimens collected              Drains and/or Catheters: * None in log *    Tourniquet Times:     Total Tourniquet Time Documented:  Arm - Upper (Right) - 118 minutes  Total: Arm - Upper (Right) - 118 minutes      Implants:Arthrex bicep button x1, Tibialis anterior allograft  Implants       Type Name Action Serial No.      Tendon FlexiGraft Implanted [id number]              Findings: Ruptured right distal bicep tendon with notable wear into the bicipital tuberosity    Indications: Gabriel Castro is an 44 y.o. male who is having surgery for right  distal bicep tendon re-rupture.  44-year-old male who is several months prior undergone a right distal bicep tendon repair.  He was noted and the first month postoperatively to have suffered a rerupture of the right distal bicep tendon.  It was discussed with the patient would benefit from revision repair with likely allograft.  Risks and benefits of this were further discussed including but not limited to bleeding infection damage to blood vessels nerves veins tendons injury to brachial artery median nerve lateral antebrachial cutaneous nerve ulnar nerve radial nerve fracture of the radius residual elbow pain stiffness loss of range of motion failure of rerepair need for repeat surgical procedure.  Patient agreeable and wished to proceed    The patient was seen in the preoperative area. The risks, benefits, complications, treatment options, non-operative alternatives, expected recovery and outcomes were discussed with the patient. The possibilities of reaction to medication, pulmonary aspiration, injury to surrounding structures, bleeding, recurrent infection, the need for additional procedures, failure to diagnose a condition, and creating a complication requiring transfusion or operation were discussed with the patient. The patient concurred with the proposed plan, giving informed consent.  The site of surgery was properly noted/marked if necessary per policy. The patient has been actively warmed in preoperative area. Preoperative antibiotics have been ordered and given within 1 hours of incision. Venous thrombosis prophylaxis have been ordered including bilateral sequential compression devices    Procedure Details:  After identification of the patient and marking of the correct operative site patient was taken to the operating room.  Perioperative antibiotics were administered SCDs applied on the bilateral extremities tourniquet applied on the patient's right axilla.  General anesthesia was administered and the  right hand and arm were then prepped and draped in the usual sterile fashion.  After prepping and draping a 4 cm incision approximately 3-1/2 cm distal to the antecubital crease, utilizing his previous incision was then marked on the skin.  Arm was elevated and exsanguinated using Esmarch tourniquet inflated to 250 mmHg.  Incision made through the skin with a 15 blade scalpel semis tissue was dissected with tenotomy scissors down to the antecubital fascia.  I then began dissecting distally towards the bicipital tuberosity and was able to palpate it in the wound.  He is able to expose the proximal end of the radius and noted a defect in the bicipital tuberosity where it appears some of the previous bicep button anchors have been placed.    Begin dissecting from a distal to proximal direction and I did down a 5 some degree of the tendon stump and the wound and the sutures present there.  Is able to pull on one of the suture tails and a bicep button was able to be removed.  FiberWire sutures within the tendon were able to be removed as well.  There was noted to be very dense scar tissue surrounding the tendon.  Lateral antebrachial cutaneous nerve was able to be identified and protected.  Some small crossing veins overlying the distal bicep tendon were cauterized to gain further exposure.   At this point an accessory incision approximately 4 cm length overlying the distal musculotendinous junction of the bicep was then made through the skin.  Subcutaneous tissues were dissected down to the distal bicep tendon and muscle belly.   There is no to be very dense overlying scar tissue around the tendon and muscle belly which was freed circumferentially with tenotomy scissors we carried dissection out from a proximal to distal direction until we could meet up at her other incision.  Once the bicep was circumferentially freed we are able to pull into the wound proximally.  We then dissected around the bicep muscle belly soft  to be adequately mobilized a #2 FiberWire's placed into the distal end of the bicep tendon for traction stitch. .  Using our tendon allograft a split was made through the bicep tendon at the distal edge of the musculotendinous junction and the tendon was then woven through the musculotendinous junction and bicep muscle belly as a Pulvertaft weave securing it as we went along with multiple 1.3 mm fiber tape sutures.  The residual bicep tendon stump was then trimmed and the tendon graft passed into the distal wound.  We reexposed the proximal radius and felt that the wear hole in the radius would be of appropriate size and location to dock the Tendon.  Position was verified with C arm and we are satisfied.  The wear hole was then freshened with a curette we then used a spade tip guidewire to drill through the back cortex of the radius.  A #2 FiberWire was placed into the distal end of the tendon graft at approximately the measured location for our appropriate bicep with length.  Bicep button was placed under the sutures and this was passed through our drill hole.  We then tensioned the tendon to reduce it into the hole in our radius.  X-ray images were then taken to verify button position and reduction and we are satisfied.  Sutures were then tied. Tourniquet deflated bleeding vessels coagulated using bipolar cautery subcutaneous layer closed with 3-0 Vicryl and skin with 4-0 Monocryl and Dermabond dressing of Telfa and Tegaderm applied Webril and a long-arm postmold splint was applied patient placed into a sling awakened from anesthesia and taken from the operating room to recover without complication.  I was present for the entire this procedure   Complications:  None; patient tolerated the procedure well.    Disposition: PACU - hemodynamically stable.  Condition: stable         Task Performed by RNFA or Surgical Assistant:  Aid in retraction tendon preparation          Additional Details: None    Attending  Attestation:     Waldo Ureña  Phone Number: [phone]

## 2024-11-27 NOTE — ANESTHESIA PROCEDURE NOTES
Airway  Date/Time: 11/27/2024 10:11 AM  Urgency: elective    Airway not difficult    Staffing  Performed: attending   Authorized by: Silverio Munoz MD    Performed by: Silverio Munoz MD  Patient location during procedure: OR    Indications and Patient Condition  Indications for airway management: anesthesia  Spontaneous ventilation: present  Sedation level: deep  Preoxygenated: yes  Patient position: sniffing  MILS not maintained throughout  Mask difficulty assessment: 0 - not attempted  Planned trial extubation    Final Airway Details  Final airway type: supraglottic airway      Successful airway: Size 4     Number of attempts at approach: 1

## 2024-11-27 NOTE — ANESTHESIA POSTPROCEDURE EVALUATION
Patient: Gabriel Castro    Procedure Summary       Date: 11/27/24 Room / Location: [facility] / [facility]    Anesthesia Start: 1003 Anesthesia Stop: 1249    Procedure: Repair Tendon with or without Tendon Graft Upper Extremity (Right: Arm Upper) Diagnosis:       Rupture of right distal biceps tendon, initial encounter      (right distal bicep tendon re-rupture)    Surgeons: Waldo Ureña MD Responsible Provider: Silverio Munoz MD    Anesthesia Type: general ASA Status: 2            Anesthesia Type: general    Vitals Value Taken Time   /86   Pulse 94   Temp 36 °C (96.8 °F) (Temporal)   Resp 25   Wt 93.1 kg (205 lb 4 oz)   BMI 33.13 kg/m²       Anesthesia Post Evaluation    Patient location during evaluation: PACU  Patient participation: complete - patient participated  Level of consciousness: sleepy but conscious  Pain score: 2  Pain management: adequate  Multimodal analgesia pain management approach  Airway patency: patent  Cardiovascular status: acceptable  Respiratory status: acceptable  Hydration status: acceptable  Postoperative Nausea and Vomiting: none        No notable events documented.

## 2024-11-27 NOTE — DISCHARGE INSTRUCTIONS
Discharge Instructions for Peripheral Nerve Block for Upper Extremity  Notify the anesthesiologist on call at [phone]:  If you have any questions or problems regarding your nerve block, go to the nearest emergency room or call 911. If you have coughing, chest pain, and/or shortness of breath unrelieved by sitting up. This may be a serious emergency.     Activity:  Your shoulder, arm, and hand will be numb and weak after surgery.   You will not be able to move your arm until the medicine wears off.  Protect the position of your arm, especially the elbow. Keep your arm in your sling resting on the two pillows while you are awake or sleeping.  Avoid putting your arm or objects that are extremely hot or cold. Your ability to feel hot and cold will be decreased until the numbing medicine wears off.    Pain Medicine:  The numbing effect of the nerve block can last:  Marcaine 16-24 hours  Exparel 28-72 hours  Take your pain medicine the night of surgery before going to sleep or before you feel the numbing medicine starting to wear off.   Take your pain medicine as specified during the day and night even if you do not feel pain.     Additional Instructions:  Have a responsible adult remain with you to assist you at home after surgery. Remember that you will not be able to use your surgical arm to perform activities such as dressing, washing, and eating.   You may experience numbness on the side of your face, hoarseness or congestion or have a red eye and drooping eyelid on the side of surgery. These side effects will decrease as the anesthesia in the shoulder wears off.   You may feel discomfort when breathing after surgery and during recovery. This is caused by the numbness of the nerve the supplies the diaphragm (breathing muscle) on the side of the surgery. You will feel better if you rest and sleep with your head and upper body at a 45 degree angle by using 2-3 pillow or be sitting in a recliner chair. This  discomfort decreases as the anesthesia in the shoulder wears off.

## 2024-11-27 NOTE — ANESTHESIA PROCEDURE NOTES
Peripheral Block    Patient location during procedure: pre-op  Start time: 11/27/2024 9:42 AM  End time: 11/27/2024 9:45 AM  Reason for block: at surgeon's request and post-op pain management  Staffing  Performed: attending   Authorized by: Silverio Munoz MD    Performed by: Silverio Munoz MD  Preanesthetic Checklist  Completed: patient identified, IV checked, site marked, risks and benefits discussed, surgical consent, monitors and equipment checked, pre-op evaluation and timeout performed   Timeout performed at: 11/27/2024 9:37 AM  Peripheral Block  Patient position: sitting  Prep: ChloraPrep  Patient monitoring: heart rate, cardiac monitor and continuous pulse ox  Block type: brachial plexus and supraclavicular  Laterality: right  Injection technique: single-shot  Guidance: ultrasound guided  Local infiltration: lidocaine  Needle  Needle type: short-bevel   Needle gauge: 20 G  Needle length: 5 cm  Needle localization: anatomical landmarks, nerve stimulator and ultrasound guidance  Assessment  Injection assessment: negative aspiration for heme, no paresthesia on injection, incremental injection and local visualized surrounding nerve on ultrasound  Paresthesia pain: none  Heart rate change: no  Slow fractionated injection: yes

## 2024-11-27 NOTE — SIGNIFICANT EVENT
Bedside report to Prerna RN to assume care of the pt on SDU. All questions answered. POC to be maintained. No issues.

## 2024-12-27 ENCOUNTER — EVALUATION (OUTPATIENT)
Dept: PHYSICAL THERAPY | Facility: CLINIC | Age: 44
End: 2024-12-27
Payer: COMMERCIAL

## 2024-12-27 DIAGNOSIS — S46.211D TRAUMATIC PARTIAL TEAR OF RIGHT BICEPS TENDON, SUBSEQUENT ENCOUNTER: Primary | ICD-10-CM

## 2024-12-27 DIAGNOSIS — S46.211A STRAIN OF MUSCLE, FASCIA AND TENDON OF OTHER PARTS OF BICEPS, RIGHT ARM, INITIAL ENCOUNTER: ICD-10-CM

## 2024-12-27 DIAGNOSIS — M25.521 RIGHT ELBOW PAIN: ICD-10-CM

## 2024-12-27 PROCEDURE — 97161 PT EVAL LOW COMPLEX 20 MIN: CPT | Mod: GP | Performed by: PHYSICAL THERAPIST

## 2024-12-27 NOTE — PROGRESS NOTES
Physical Therapy Evaluation and Treatment     Patient Name: Gabriel Castro  MRN: [id number]  Encounter date: 12/27/2024  Time Calculation  Start Time: 0846  Stop Time: 0910  Time Calculation (min): 24 min  PT Evaluation Time Entry  PT Evaluation (Low) Time Entry: 24  Low complexity due to patient's clinical presentation being stable and uncomplicated by any significant comorbidities that may affect rehab tolerance and progression.     Visit # 1 of 16  Visits/Dates Authorized:  [facility] - 16 VISITS APPROVED / See C9 for instructions - saved in Media Mgr / DATES 11/1/24 - 2/1/25 / DX CODES S46.211A   Insurance Type: Payor: [email or web address] / Plan: BANG SELF INSURED / Product Type: *No Product type* /     Current Problem:   Problem List Items Addressed This Visit             ICD-10-CM    Traumatic partial tear of right biceps tendon - Primary S46.211A     Other Visit Diagnoses         Codes    Strain of muscle, fascia and tendon of other parts of biceps, right arm, initial encounter     S46.211A    Relevant Orders    Follow Up In Physical Therapy    Right elbow pain     M25.521    Relevant Orders    Follow Up In Physical Therapy          Precautions:  Precautions  Post-Surgical Precautions: Other (comment) (R bicep distal rupture repair 11/27/24)       Subjective    Subjective Evaluation    History of Present Illness  Date of surgery: 11/27/2024 (8/26/24 1st surgery)  Mechanism of injury: Pt presents to therapy s/p R distal bicep reconstruction with allograft performed on 11/27/24 following re-rupture of first surgery on 8/26/24. Pt was lifting a heavy tote at work and felt a rubber band stretch type feeling and ruptured his bicep. He was told that it was a very severe rupture and the first one may not hold and it unfortunately did ruptured and required a second surgery a month ago. He has been having numbness along forearm and into the thumb to the point that when he presses on it.            Objective      Objective      Observations     Right Elbow   Positive for adhesive scar.     Additional Observation Details  Keloid type scaring present however not inhibiting motion     Palpation     Additional Palpation Details  No palpable discomfort at this time with overall good soft tissue mobility     Passive Range of Motion     Right Elbow   Normal passive range of motion    Right Wrist   Normal passive range of motion    Additional Passive Range of Motion Details  Within limits of post-op precautions; good joint feel without any abnormal end feel or restrictions at this time        Outcome Measures:  Other Measures  Other Outcome Measures: UEFS 25     Treatments:  Therapeutic Activity  Therapeutic Activity Performed: Yes  Therapeutic Activity 1: Educated on post-op protocol    HEP / Access Codes:   Access Code: [email or web address]  URL: [email or web address]  Date: 12/27/2024  Prepared by: Trina Husain    Exercises  - Seated Supination and Pronation Coordination  - 2 x daily - 7 x weekly - 3 sets - 10 reps  - Wrist AROM Flexion Extension  - 2 x daily - 7 x weekly - 3 sets - 10 reps  - Seated Elbow Extension and Shoulder External Rotation AAROM at Table with Towel  - 2 x daily - 7 x weekly - 3 sets - 10 reps    Assessment   Assessment & Plan     Assessment  Impairments: abnormal muscle tone, abnormal or restricted ROM, impaired physical strength, lacks appropriate home exercise program and weight-bearing intolerance  Assessment details: Pt is a 44 y.o male presenting to therapy s/p R distal bicep reconstruction on 11/27/24. Pt currently exhibiting function within current post-op precautions. Passive ROM of R elbow demonstrated good joint movement with minimal soft tissue restriction. Forearm mobility was also noted to be within normal limits with date. Strength testing was deffered due to post-op status. Greatest deficit noted was paresthesia of forearm and R thumb without any change during therapy this date as well as keloid type  scaring but is not currently inhibiting mobility. Overall pt demonstrating good post-op healing thus far. At this time pt would benefit from skilled physical therapy in order to prevent further functional decline and promote optimal post-op healing.    Prognosis: good    Plan  Therapy options: will be seen for skilled physical therapy services  Planned modality interventions: low level laser therapy and TENS  Other planned modality interventions: KT Tape/Dry Needling/Cupping  Planned therapy interventions: manual therapy, ADL retraining, motor coordination training, muscle pump exercises, body mechanics training, postural training, neuromuscular re-education, fine motor coordination training, soft tissue mobilization, flexibility, strengthening, functional ROM exercises, stretching, therapeutic activities, home exercise program and joint mobilization  Frequency: 1-2x/week.  Duration in visits: 16  Duration in weeks: 8  Treatment plan discussed with: patient           Goals:   Active       LTG       Pt will be 100% IND with HEP in 8 weeks in order to maintain progress with therapy.         Start:  12/27/24    Expected End:  02/25/25            Pt will demonstrate full R elbow AROM and strength in 8 weeks for return to work.        Start:  12/27/24    Expected End:  02/25/25            Pt will demonstrate subjective improvement of ADLs and recreational activities through improved score of 70 on UEFS in 8 weeks for return to work.        Start:  12/27/24    Expected End:  02/25/25               STG       Pt will be 50% IND with HEP in 4 weeks in order to progress with therapy.        Start:  12/27/24    Expected End:  01/31/25            Pt will demonstrated improve AROM of R elbow as follows: 0-120 in 4 weeks to improve mobility required for dressing, bathing, cooking and cleaning.        Start:  12/27/24    Expected End:  01/31/25            Pt will improve R Elbow strength to 4/5 in 4 weeks in order to improve  strength required to lift objects at home including groceries and to improve cleaning tasks.         Start:  12/27/24    Expected End:  01/31/25            Pt will demonstrate subjective improvement of ADLs and recreational activities through improved score of 50 on UEFS in 4 weeks for improved return to work.        Start:  12/27/24    Expected End:  01/31/25

## 2024-12-31 ENCOUNTER — APPOINTMENT (OUTPATIENT)
Dept: PHYSICAL THERAPY | Facility: CLINIC | Age: 44
End: 2024-12-31
Payer: COMMERCIAL

## 2024-12-31 ENCOUNTER — DOCUMENTATION (OUTPATIENT)
Dept: PHYSICAL THERAPY | Facility: CLINIC | Age: 44
End: 2024-12-31
Payer: COMMERCIAL

## 2024-12-31 NOTE — PROGRESS NOTES
Physical Therapy                 Therapy Communication Note    Patient Name: Gabriel Castro  MRN: [id number]  Department:   Room: Room/bed info not found  Today's Date: 12/31/2024     Discipline: Physical Therapy          Missed Visit Reason:      Missed Time: Cancel    Comment:Pt cancels appt via [employer].

## 2025-01-07 ENCOUNTER — TREATMENT (OUTPATIENT)
Dept: PHYSICAL THERAPY | Facility: CLINIC | Age: 45
End: 2025-01-07
Payer: COMMERCIAL

## 2025-01-07 DIAGNOSIS — S46.211A STRAIN OF MUSCLE, FASCIA AND TENDON OF OTHER PARTS OF BICEPS, RIGHT ARM, INITIAL ENCOUNTER: ICD-10-CM

## 2025-01-07 DIAGNOSIS — M25.521 RIGHT ELBOW PAIN: ICD-10-CM

## 2025-01-07 PROCEDURE — 97110 THERAPEUTIC EXERCISES: CPT | Mod: GP,CQ

## 2025-01-07 NOTE — PROGRESS NOTES
Physical Therapy Treatment    Patient Name: Gabriel Castro  MRN: [id number]  Today's Date: 1/7/2025  Time Calculation  Start Time: 1330  Stop Time: 1400  Time Calculation (min): 30 min  PT Therapeutic Procedures Time Entry  Therapeutic Exercise Time Entry: 30    Insurance:  Visit number: 2 of 16  Authorization info: 12/24/24:  [facility] - 16 VISITS APPROVED / See C9 for instructions - saved in Media Mgr / DATES 11/1/24 - 2/1/25 / DX CODES S46.211A / CLAIM # [id number] / DOI ? / ds   Insurance Type: Payor: BANG / Plan: BANG SELF INSURED / Product Type: *No Product type* /     Current Problem   1. Strain of muscle, fascia and tendon of other parts of biceps, right arm, initial encounter  Follow Up In Physical Therapy      2. Right elbow pain  Follow Up In Physical Therapy          Subjective   General    Pt reports that he has no pain and is doing well.  Precautions:   R distal bicep repair revision  Pain    0  Post Treatment Pain Level 0    Objective   R elbow PROM : flexion full; extension -30    Treatments:  Therapeutic Exercise:   UBE  Sub-max elbow flexion/supination isometrics x 15 each  Prone fly, row, extension x 20 each  S/L ER x 20   Tricep extension to 30 degrees of flexion with OTB x 20    Assessment   Assessment:    Pt tolerated session well. No npain with there ex.    Plan:    Continue with POC.    OP EDUCATION:   Updated HEP    Goals:   Active       LTG       Pt will be 100% IND with HEP in 8 weeks in order to maintain progress with therapy.         Start:  12/27/24    Expected End:  02/25/25            Pt will demonstrate full R elbow AROM and strength in 8 weeks for return to work.        Start:  12/27/24    Expected End:  02/25/25            Pt will demonstrate subjective improvement of ADLs and recreational activities through improved score of 70 on UEFS in 8 weeks for return to work.        Start:  12/27/24    Expected End:  02/25/25               STG       Pt will be 50% IND with HEP in 4  weeks in order to progress with therapy.        Start:  12/27/24    Expected End:  01/31/25            Pt will demonstrated improve AROM of R elbow as follows: 0-120 in 4 weeks to improve mobility required for dressing, bathing, cooking and cleaning.        Start:  12/27/24    Expected End:  01/31/25            Pt will improve R Elbow strength to 4/5 in 4 weeks in order to improve strength required to lift objects at home including groceries and to improve cleaning tasks.         Start:  12/27/24    Expected End:  01/31/25            Pt will demonstrate subjective improvement of ADLs and recreational activities through improved score of 50 on UEFS in 4 weeks for improved return to work.        Start:  12/27/24    Expected End:  01/31/25

## 2025-01-10 ENCOUNTER — APPOINTMENT (OUTPATIENT)
Dept: PHYSICAL THERAPY | Facility: CLINIC | Age: 45
End: 2025-01-10
Payer: COMMERCIAL

## 2025-01-22 ENCOUNTER — TELEPHONE (OUTPATIENT)
Dept: PHYSICAL THERAPY | Facility: CLINIC | Age: 45
End: 2025-01-22
Payer: COMMERCIAL

## 2025-01-23 ENCOUNTER — TREATMENT (OUTPATIENT)
Dept: PHYSICAL THERAPY | Facility: CLINIC | Age: 45
End: 2025-01-23
Payer: COMMERCIAL

## 2025-01-23 DIAGNOSIS — S46.211A STRAIN OF MUSCLE, FASCIA AND TENDON OF OTHER PARTS OF BICEPS, RIGHT ARM, INITIAL ENCOUNTER: ICD-10-CM

## 2025-01-23 DIAGNOSIS — M25.521 RIGHT ELBOW PAIN: ICD-10-CM

## 2025-01-23 PROCEDURE — 97140 MANUAL THERAPY 1/> REGIONS: CPT | Mod: GP,CQ

## 2025-01-23 PROCEDURE — 97110 THERAPEUTIC EXERCISES: CPT | Mod: GP,CQ

## 2025-01-23 NOTE — PROGRESS NOTES
Physical Therapy Treatment    Patient Name: Gabriel Castro  MRN: [id number]  Today's Date: 1/23/2025  Time Calculation  Start Time: 1015  Stop Time: 1100  Time Calculation (min): 45 min  PT Therapeutic Procedures Time Entry  Manual Therapy Time Entry: 10  Therapeutic Exercise Time Entry: 30    Insurance:  Visit number: 3 of 16  Authorization info: 2/24/24:  [facility] - 16 VISITS APPROVED / See C9 for instructions - saved in Media Mgr / DATES 11/1/24 - 2/1/25 / DX CODES S46.211A / CLAIM # [id number] / DOI ? / ds   Insurance Type: Payor: BANG / Plan: BANG SELF INSURED / Product Type: *No Product type* /     Current Problem   1. Strain of muscle, fascia and tendon of other parts of biceps, right arm, initial encounter  Follow Up In Physical Therapy      2. Right elbow pain  Follow Up In Physical Therapy          Subjective   General    Pt reports that he is doing great. Pt is back at work doing light duty with no lifting with R UE.  Precautions:   R distal bicep repair 11/27/24  Pain    0  Post Treatment Pain Level 0    Objective   R elbow ROM ( missing 5 degrees of full elbow extension)  Full elbow flexion  Lacing 10 degrees of elbow supination    Treatments:  Therapeutic Exercise:   UBE x 3'  Seated forearm supination/pronation AROM x 20  LA scap retractions with L3 TB x 30  Shoulder flexion with neutral  L3 TB x 20   Shoulder abduction with neutral  L3 TB x 20   ER/IR with L3 TB  x 20 each  Supinated bicep curls against gravity x 30      Manual:  Scar mobilization   R Elbow flexion stretch  R elbow supination stretch    Assessment   Assessment:    Pt is progressing well per protocol. Focused on GH strengthening and reaching full elbow/forearm AROM    Plan:    Continue with POC. Progress strengthening per protocol as able.      OP EDUCATION:       Goals:   Active       LTG       Pt will be 100% IND with HEP in 8 weeks in order to maintain progress with therapy.   (Progressing)       Start:  12/27/24     Expected End:  02/25/25            Pt will demonstrate full R elbow AROM and strength in 8 weeks for return to work.  (Progressing)       Start:  12/27/24    Expected End:  02/25/25            Pt will demonstrate subjective improvement of ADLs and recreational activities through improved score of 70 on UEFS in 8 weeks for return to work.  (Progressing)       Start:  12/27/24    Expected End:  02/25/25               STG       Pt will be 50% IND with HEP in 4 weeks in order to progress with therapy.  (Progressing)       Start:  12/27/24    Expected End:  01/31/25            Pt will demonstrated improve AROM of R elbow as follows: 0-120 in 4 weeks to improve mobility required for dressing, bathing, cooking and cleaning.  (Progressing)       Start:  12/27/24    Expected End:  01/31/25            Pt will improve R Elbow strength to 4/5 in 4 weeks in order to improve strength required to lift objects at home including groceries and to improve cleaning tasks.   (Progressing)       Start:  12/27/24    Expected End:  01/31/25            Pt will demonstrate subjective improvement of ADLs and recreational activities through improved score of 50 on UEFS in 4 weeks for improved return to work.  (Progressing)       Start:  12/27/24    Expected End:  01/31/25

## 2025-01-27 ENCOUNTER — APPOINTMENT (OUTPATIENT)
Dept: PHYSICAL THERAPY | Facility: CLINIC | Age: 45
End: 2025-01-27
Payer: COMMERCIAL

## 2025-01-28 ENCOUNTER — TREATMENT (OUTPATIENT)
Dept: PHYSICAL THERAPY | Facility: CLINIC | Age: 45
End: 2025-01-28
Payer: COMMERCIAL

## 2025-01-28 DIAGNOSIS — M25.521 RIGHT ELBOW PAIN: ICD-10-CM

## 2025-01-28 DIAGNOSIS — S46.211A STRAIN OF MUSCLE, FASCIA AND TENDON OF OTHER PARTS OF BICEPS, RIGHT ARM, INITIAL ENCOUNTER: ICD-10-CM

## 2025-01-28 PROCEDURE — 97140 MANUAL THERAPY 1/> REGIONS: CPT | Mod: GP,CQ

## 2025-01-28 PROCEDURE — 97110 THERAPEUTIC EXERCISES: CPT | Mod: GP,CQ

## 2025-01-28 ASSESSMENT — PAIN SCALES - GENERAL: PAINLEVEL_OUTOF10: 0 - NO PAIN

## 2025-01-28 NOTE — PROGRESS NOTES
"Physical Therapy Treatment    Patient Name: Gabriel Castro  MRN: [id number]  Today's Date: 1/28/2025  Time Calculation  Start Time: 1155  Stop Time: 1243  Time Calculation (min): 48 min  PT Therapeutic Procedures Time Entry  Manual Therapy Time Entry: 12  Therapeutic Exercise Time Entry: 33    Insurance:  Visit number: 4 of 16  Authorization info: [facility] - 60-day presumptive period.  If more than 12 visits, auth is needed / CLAIM: [id number] / DOI ?? / POSTOP PT 1-2x wk, x 8 wks / C9 from drs office in [employer] M[employer] / No info on [facility] w/s / ds 10/28/24.      Insurance Type: Payor: [employer] / Plan: BANG SELF INSURED / Product Type: *No Product type* /     Current Problem   1. Strain of muscle, fascia and tendon of other parts of biceps, right arm, initial encounter  Follow Up In Physical Therapy      2. Right elbow pain  Follow Up In Physical Therapy          Subjective   General   Reason for Referral: Strain of muscle, fascia and tendon of other parts of biceps, right arm, Right elbow pain  Referred By: Waldo Ureña MD  General Comment: Pt reports tightness at ant elbow, no pain on arrival.  Pt states having follow earlier up this morning with Dr Ureña, Brace is discharged and Pt reports he now has a 3# limit for lifting.  Precautions:  Precautions  Post-Surgical Precautions: Other (comment) (R bicep distal rupture repair 11/27/24)  Pain   0-10 (Numeric) Pain Score: 0 - No pain  Post Treatment Pain Level 0/10    Objective   Pt requires moderate postural cueing this visit.    Treatments:  Therapeutic Exercise:  Therapeutic Exercise  Therapeutic Exercise Performed: Yes  Therapeutic Exercise Activity 1: UBE BWD/FWD 2.5 mins each  Therapeutic Exercise Activity 2: Wall slides 2x10  Therapeutic Exercise Activity 3: Scapular retraction 5\" hold 2x10  Therapeutic Exercise Activity 4: Rows OTB 2x10  Therapeutic Exercise Activity 5: Shoulder ext OTB 2x10  Therapeutic Exercise Activity 6: IR OTB 2x10  Therapeutic " Exercise Activity 7: ER OTB 2x10  Therapeutic Exercise Activity 8: AROM bicep curls 2x10  Therapeutic Exercise Activity 9: Shoulder flexion 1# 2x10  Therapeutic Exercise Activity 10: Shoulder abduction 1# 2x10    Manual:  Manual Therapy  Manual Therapy Performed: Yes  Manual Therapy Activity 1: Manual biceps stretch, STM  Manual Therapy Activity 2: Cross fiber techniques on scar tissue.       Assessment   Assessment:   PT Assessment  PT Assessment Results:  (abnormal muscle tone, abnormal or restricted ROM, impaired physical strength, lacks appropriate home exercise program and weight-bearing intolerance)  Rehab Prognosis: Good  Evaluation/Treatment Tolerance: Patient tolerated treatment well  Assessment Comment: Pt tolerates ther ex progressions without exacerbation of S/S, full extension of R elbow this visit, poor to fair postural awareness observed.    Plan:   OP PT Plan  Treatment/Interventions: Taping techniques, Other (comment) (Dry needling, cupping.manual therapy, ADL retraining, muscle pump exercises, body mechanics, postural training, neuromuscular re-education, fine motor coordination, ther ex, functional ROM exercises, stretching, therapeutic activities, HEP.)  PT Plan: Skilled PT  PT Frequency: 2 times per week  Duration: 8 weeks  Certification Period Start Date: 11/01/24  Certification Period End Date: 02/01/25  Number of Treatments Authorized: 16  Rehab Potential: Good    OP EDUCATION:  Outpatient Education  Individual(s) Educated: Patient  Education Provided: Anatomy, Body Mechanics, Physiology  Patient/Caregiver Demonstrated Understanding: yes  Patient Response to Education: Patient/Caregiver Verbalized Understanding of Information, Patient/Caregiver Performed Return Demonstration of Exercises/Activities, Patient/Caregiver Asked Appropriate Questions    HEP/Access Codes:    Goals:   Active       LTG       Pt will be 100% IND with HEP in 8 weeks in order to maintain progress with therapy.    (Progressing)       Start:  12/27/24    Expected End:  02/25/25            Pt will demonstrate full R elbow AROM and strength in 8 weeks for return to work.  (Progressing)       Start:  12/27/24    Expected End:  02/25/25            Pt will demonstrate subjective improvement of ADLs and recreational activities through improved score of 70 on UEFS in 8 weeks for return to work.  (Progressing)       Start:  12/27/24    Expected End:  02/25/25               STG       Pt will be 50% IND with HEP in 4 weeks in order to progress with therapy.  (Progressing)       Start:  12/27/24    Expected End:  01/31/25            Pt will demonstrated improve AROM of R elbow as follows: 0-120 in 4 weeks to improve mobility required for dressing, bathing, cooking and cleaning.  (Progressing)       Start:  12/27/24    Expected End:  01/31/25            Pt will improve R Elbow strength to 4/5 in 4 weeks in order to improve strength required to lift objects at home including groceries and to improve cleaning tasks.   (Progressing)       Start:  12/27/24    Expected End:  01/31/25            Pt will demonstrate subjective improvement of ADLs and recreational activities through improved score of 50 on UEFS in 4 weeks for improved return to work.  (Progressing)       Start:  12/27/24    Expected End:  01/31/25

## 2025-01-31 ENCOUNTER — TREATMENT (OUTPATIENT)
Dept: PHYSICAL THERAPY | Facility: CLINIC | Age: 45
End: 2025-01-31
Payer: COMMERCIAL

## 2025-01-31 ENCOUNTER — APPOINTMENT (OUTPATIENT)
Dept: PHYSICAL THERAPY | Facility: CLINIC | Age: 45
End: 2025-01-31
Payer: COMMERCIAL

## 2025-01-31 DIAGNOSIS — M25.521 RIGHT ELBOW PAIN: ICD-10-CM

## 2025-01-31 DIAGNOSIS — S46.211A STRAIN OF MUSCLE, FASCIA AND TENDON OF OTHER PARTS OF BICEPS, RIGHT ARM, INITIAL ENCOUNTER: ICD-10-CM

## 2025-01-31 PROCEDURE — 97110 THERAPEUTIC EXERCISES: CPT | Mod: GP | Performed by: PHYSICAL THERAPIST

## 2025-01-31 PROCEDURE — 97140 MANUAL THERAPY 1/> REGIONS: CPT | Mod: GP | Performed by: PHYSICAL THERAPIST

## 2025-01-31 ASSESSMENT — PAIN - FUNCTIONAL ASSESSMENT: PAIN_FUNCTIONAL_ASSESSMENT: 0-10

## 2025-01-31 ASSESSMENT — PAIN SCALES - GENERAL: PAINLEVEL_OUTOF10: 4

## 2025-01-31 NOTE — PROGRESS NOTES
Physical Therapy Treatment    Patient Name: Gabriel Castro  MRN: [id number]  Today's Date: 1/31/2025  Time Calculation  Start Time: 1000    Insurance:  Visit number: 5 of 16  Authorization info: 12/24/24:  [facility] - 16 VISITS APPROVED / See C9 for instructions - saved in [employer] / DATES 11/1/24 - 2/1/25 / DX CODES S46.211A / CLAIM # [id number] / DOI ? / ds     [facility] - 60-day presumptive period.  If more than 12 visits, auth is needed / CLAIM: [id number] / DOI ?? / POSTOP PT 1-2x wk, x 8 wks / C9 from drs office in [employer] / No info on [facility] w/s / ds 10/28/24.    Insurance Type: Payor: [employer] / Plan: BANG SELF INSURED / Product Type: *No Product type* /     Current Problem   1. Strain of muscle, fascia and tendon of other parts of biceps, right arm, initial encounter  Follow Up In Physical Therapy      2. Right elbow pain  Follow Up In Physical Therapy          Subjective   General   Reason for Referral: Strain of muscle, fascia and tendon of other parts of biceps, right arm, Right elbow pain  Referred By: Waldo Ureña MD  General Comment: still restricted to 3# lifting with R UE  Precautions:  Precautions  Post-Surgical Precautions:  (S/P R biceps rupture tendon repair 11/27/24)  Pain   Pain Assessment: 0-10  0-10 (Numeric) Pain Score: 4 (R biceps)  Post Treatment Pain Level no change    Objective       Sensation/Palpation:  Pt reports decreased sensation R Thumb to wrist then improves proximally  Incisional tenderness -distal  Keloid noted- scar mobile  AROM  R shoulder AROM flex 160 degrees   degrees  MR to waist band  LR full  R pec major tightness and pec minor    Elbow AROM   Flex 135 degrees  Ext -10    Wrist flex ext WNL's  Pronation 7/8 range with discomfort cubital fossa      Strength:  R shoulder flex abd 5/5  Resisted supination fine 3+  Resisted pronation weak and discomfort 2+  Wrist flex ext 4+/5   strong and equal to L    Outcome Measures:  Other Measures  Other Outcome  Measures: UEFS 25    1/31/25:  UEFS  56      Treatments:  Therapeutic Exercise:  Therapeutic Exercise  Therapeutic Exercise Performed: Yes  Therapeutic Exercise Activity 1: UBE BWD/FWD 2.5 mins each  Therapeutic Exercise Activity 2: door pec major and minor stretches x3 10 ct hold  Therapeutic Exercise Activity 3: wall towel slides 20  Therapeutic Exercise Activity 4: scpa retraction with orange t-band 2 x15  Therapeutic Exercise Activity 5: ER with towel under arm Orange t-band 2 x15  Therapeutic Exercise Activity 6: IR with towel under arm 2 x15        Assessment   Assessment:   PT Assessment  Assessment Comment: Pt is progressing as expected and compliant with current post -op restrictions.  Pt limited lifting </=3# R UE.  Pt with limited AROM R elbow lacking 10 degrees ext and flex to 135 degrees.  Pt with muscle weakness R biceps and forearm and wrist.  Pt is compliant with current HEP.  Sensation is still impaired R thumb; however, improved from the evaluation.  Pt with increased UEFS score from 25 to 56/80.  Pt progressing with all goals at this time and will benefit from continued skilled PT in order to return to prior level of function.    Plan: continue to progress per protocol and post op restrictions.       OP EDUCATION:   Pt educated on POC and need for C-9 date extension    HEP/Access Codes:  HEP / Access Codes:   Access Code: [email or web address]  URL: [email or web address]  Date: 12/27/2024  Prepared by: Trina Husain     Exercises  - Seated Supination and Pronation Coordination  - 2 x daily - 7 x weekly - 3 sets - 10 reps  - Wrist AROM Flexion Extension  - 2 x daily - 7 x weekly - 3 sets - 10 reps  - Seated Elbow Extension and Shoulder External Rotation AAROM at Table with Towel  - 2 x daily - 7 x weekly - 3 sets - 10 reps        Goals:   Active       LTG       Pt will be 100% IND with HEP in 8 weeks in order to maintain progress with therapy.   (Progressing)       Start:  12/27/24    Expected End:   02/25/25            Pt will demonstrate full R elbow AROM and strength in 8 weeks for return to work.  (Progressing)       Start:  12/27/24    Expected End:  02/25/25            Pt will demonstrate subjective improvement of ADLs and recreational activities through improved score of 70 on UEFS in 8 weeks for return to work.  (Progressing)       Start:  12/27/24    Expected End:  02/25/25               STG       Pt will be 50% IND with HEP in 4 weeks in order to progress with therapy.  (Progressing)       Start:  12/27/24    Expected End:  01/31/25            Pt will demonstrated improve AROM of R elbow as follows: 0-120 in 4 weeks to improve mobility required for dressing, bathing, cooking and cleaning.  (Progressing)       Start:  12/27/24    Expected End:  01/31/25            Pt will improve R Elbow strength to 4/5 in 4 weeks in order to improve strength required to lift objects at home including groceries and to improve cleaning tasks.   (Progressing)       Start:  12/27/24    Expected End:  01/31/25            Pt will demonstrate subjective improvement of ADLs and recreational activities through improved score of 50 on UEFS in 4 weeks for improved return to work.  (Progressing)       Start:  12/27/24    Expected End:  01/31/25

## 2025-02-05 ENCOUNTER — TREATMENT (OUTPATIENT)
Dept: PHYSICAL THERAPY | Facility: CLINIC | Age: 45
End: 2025-02-05
Payer: COMMERCIAL

## 2025-02-05 DIAGNOSIS — M25.521 RIGHT ELBOW PAIN: ICD-10-CM

## 2025-02-05 DIAGNOSIS — S46.211A STRAIN OF MUSCLE, FASCIA AND TENDON OF OTHER PARTS OF BICEPS, RIGHT ARM, INITIAL ENCOUNTER: ICD-10-CM

## 2025-02-05 PROCEDURE — 97110 THERAPEUTIC EXERCISES: CPT | Mod: GP,CQ

## 2025-02-05 NOTE — PROGRESS NOTES
Physical Therapy Treatment    Patient Name: Gabriel Castro  MRN: [id number]  Today's Date: 2/5/2025  Time Calculation  Start Time: 0840  Stop Time: 0920  Time Calculation (min): 40 min  PT Therapeutic Procedures Time Entry  Therapeutic Exercise Time Entry: 40    Insurance:  Visit number: 6 of 16  Authorization info: 12/24/24:  [facility] - 16 VISITS APPROVED / See C9 for instructions - saved in [employer] / DATES 11/1/24 - 2/1/25 / DX CODES S46.211A / CLAIM # [id number] / DOI ? / ds     [facility] - 60-day presumptive period.  If more than 12 visits, auth is needed / CLAIM: [id number] / DOI ?? / POSTOP PT 1-2x wk, x 8 wks / C9 from drs office in [employer] / No info on [facility] w/s / ds 10/28/24.    Insurance Type: Payor: BANG / Plan: BANG SELF INSURED / Product Type: *No Product type* /     Current Problem   1. Strain of muscle, fascia and tendon of other parts of biceps, right arm, initial encounter  Follow Up In Physical Therapy      2. Right elbow pain  Follow Up In Physical Therapy          Subjective   General    Pt reports that he is doing well. No complaints.  Precautions:   R distal bicep repair (10.5 weeks S/P)  Pain    0  Post Treatment Pain Level 0    Objective   Good tolerance with    Treatments:  Therapeutic Exercise:   UBE x 3   Scap add with PB TB x 20   Gh extensions with PB TB x 20  ER/IR with Lime green TB 2 x 15 each  Shoulder flexion with neutral  #2 x 20  Shoulder ABD x 20 #2  Upper cuts with 40 degrees elbow flexion x 20 #2   Supination/pronation with hammer x 20  Ulnar deviation with hammer x 20  Bicep curls with #2 2 x 15    Manual:  Long duration low load stretch for R bicep  Assessment   Assessment:    Pt is progressing well per protocol.    Plan:    Continue with R UE strengthening    OP EDUCATION:       HEP/Access Codes:    Goals:   Active       LTG       Pt will be 100% IND with HEP in 8 weeks in order to maintain progress with therapy.   (Progressing)       Start:  12/27/24    Expected  End:  02/25/25            Pt will demonstrate full R elbow AROM and strength in 8 weeks for return to work.  (Progressing)       Start:  12/27/24    Expected End:  02/25/25            Pt will demonstrate subjective improvement of ADLs and recreational activities through improved score of 70 on UEFS in 8 weeks for return to work.  (Progressing)       Start:  12/27/24    Expected End:  02/25/25               STG       Pt will be 50% IND with HEP in 4 weeks in order to progress with therapy.  (Progressing)       Start:  12/27/24    Expected End:  01/31/25            Pt will demonstrated improve AROM of R elbow as follows: 0-120 in 4 weeks to improve mobility required for dressing, bathing, cooking and cleaning.  (Progressing)       Start:  12/27/24    Expected End:  01/31/25            Pt will improve R Elbow strength to 4/5 in 4 weeks in order to improve strength required to lift objects at home including groceries and to improve cleaning tasks.   (Progressing)       Start:  12/27/24    Expected End:  01/31/25            Pt will demonstrate subjective improvement of ADLs and recreational activities through improved score of 50 on UEFS in 4 weeks for improved return to work.  (Met)       Start:  12/27/24    Expected End:  01/31/25    Resolved:  01/31/25              [Consultation] : a consultation visit [FreeTextEntry2] : hemoptysis

## 2025-02-19 ENCOUNTER — TREATMENT (OUTPATIENT)
Dept: PHYSICAL THERAPY | Facility: CLINIC | Age: 45
End: 2025-02-19
Payer: COMMERCIAL

## 2025-02-19 ENCOUNTER — DOCUMENTATION (OUTPATIENT)
Dept: PHYSICAL THERAPY | Facility: CLINIC | Age: 45
End: 2025-02-19
Payer: COMMERCIAL

## 2025-02-19 DIAGNOSIS — S46.211A STRAIN OF MUSCLE, FASCIA AND TENDON OF OTHER PARTS OF BICEPS, RIGHT ARM, INITIAL ENCOUNTER: ICD-10-CM

## 2025-02-19 DIAGNOSIS — M25.521 RIGHT ELBOW PAIN: ICD-10-CM

## 2025-02-19 NOTE — PROGRESS NOTES
Physical Therapy Treatment    Patient Name: Gabriel Castro  MRN: [id number]  Today's Date: 2/19/2025       Insurance:  Visit number: 7 of ***  Authorization info: ***  Insurance Type: Payor: BANG / Plan: BANG SELF INSURED / Product Type: *No Product type* /     Current Problem   No diagnosis found.    Subjective   General      Precautions:     Pain      Post Treatment Pain Level ***    Objective   ***    Treatments:  Therapeutic Exercise:    UBE x 3   Scap add with PB TB x 20   Gh extensions with PB TB x 20  ER/IR with Lime green TB 2 x 15 each  Shoulder flexion with neutral  #2 x 20  Shoulder ABD x 20 #2  Upper cuts with 40 degrees elbow flexion x 20 #2   Supination/pronation with hammer x 20  Ulnar deviation with hammer x 20  Bicep curls with #2 2 x 15     Manual:  Long duration low load stretch for R bicep  Therapeutic activity:     Neuro Re-ed:      Manual:     Gait:     Stair:     Transfer:      Modalities  {Modalities Used:34493}     Assessment   Assessment:        Plan:        OP EDUCATION:       Goals:   Active       LTG       Pt will be 100% IND with HEP in 8 weeks in order to maintain progress with therapy.   (Progressing)       Start:  12/27/24    Expected End:  02/25/25            Pt will demonstrate full R elbow AROM and strength in 8 weeks for return to work.  (Progressing)       Start:  12/27/24    Expected End:  02/25/25            Pt will demonstrate subjective improvement of ADLs and recreational activities through improved score of 70 on UEFS in 8 weeks for return to work.  (Progressing)       Start:  12/27/24    Expected End:  02/25/25               STG       Pt will be 50% IND with HEP in 4 weeks in order to progress with therapy.  (Progressing)       Start:  12/27/24    Expected End:  01/31/25            Pt will demonstrated improve AROM of R elbow as follows: 0-120 in 4 weeks to improve mobility required for dressing, bathing, cooking and cleaning.  (Progressing)       Start:   12/27/24    Expected End:  01/31/25            Pt will improve R Elbow strength to 4/5 in 4 weeks in order to improve strength required to lift objects at home including groceries and to improve cleaning tasks.   (Progressing)       Start:  12/27/24    Expected End:  01/31/25            Pt will demonstrate subjective improvement of ADLs and recreational activities through improved score of 50 on UEFS in 4 weeks for improved return to work.  (Met)       Start:  12/27/24    Expected End:  01/31/25    Resolved:  01/31/25

## 2025-02-19 NOTE — PROGRESS NOTES
Physical Therapy                 Therapy Communication Note    Patient Name: Gabriel Castro  MRN: [id number]  Department:   Room: Room/bed info not found  Today's Date: 2/19/2025     Discipline: Physical Therapy          Missed Visit Reason:  Patient has not been approved for more PT by [facility]    Missed Time: Cancel    Comment:

## 2025-02-25 ENCOUNTER — APPOINTMENT (OUTPATIENT)
Dept: PHYSICAL THERAPY | Facility: CLINIC | Age: 45
End: 2025-02-25
Payer: COMMERCIAL

## 2025-03-05 ENCOUNTER — TREATMENT (OUTPATIENT)
Dept: PHYSICAL THERAPY | Facility: CLINIC | Age: 45
End: 2025-03-05
Payer: COMMERCIAL

## 2025-03-05 DIAGNOSIS — M25.521 RIGHT ELBOW PAIN: ICD-10-CM

## 2025-03-05 DIAGNOSIS — S46.211A STRAIN OF MUSCLE, FASCIA AND TENDON OF OTHER PARTS OF BICEPS, RIGHT ARM, INITIAL ENCOUNTER: ICD-10-CM

## 2025-03-05 PROCEDURE — 97110 THERAPEUTIC EXERCISES: CPT | Mod: GP,CQ

## 2025-03-05 NOTE — PROGRESS NOTES
Physical Therapy Treatment    Patient Name: Gabriel Castro  MRN: [id number]  Today's Date: 3/5/2025  Time Calculation  Start Time: 1015  Stop Time: 1045  Time Calculation (min): 30 min  PT Therapeutic Procedures Time Entry  Therapeutic Exercise Time Entry: 25    Insurance:  Visit number: 7 of 16  Authorization info: 02/19/2025: [facility] - Extension Approved  2x a week/ 6 weeks/ Dates 02/19/2025 - 4/5/2025/ DX Codes S46.11A/ See    Insurance Type: Payor: BANG / Plan: BANG SELF INSURED / Product Type: *No Product type* /     Current Problem   1. Strain of muscle, fascia and tendon of other parts of biceps, right arm, initial encounter  Follow Up In Physical Therapy      2. Right elbow pain  Follow Up In Physical Therapy          Subjective   General    Pt continues to feel better. Plan is to go back to work in a month.  Precautions:   R bicep repair   Pain    0  Post Treatment Pain Level 0    Objective   Pt demonstrated good AROM in all planes with R shoulder.    Treatments:  Therapeutic Exercise:    UBE x 3   Scap add with PB TB x 20   Gh extensions with PB TB x 20  ER/IR with Lime PB TB 2 x 15 each  Bosu pushes x 20  D2 flexion with OTB 2 x 15   90/90 ER with OTB 2  x15  Ulnar deviation with hammer x 20  Bicep curls (hammer/ supinated)with #5 2 x 15  Assessment   Assessment:    Pt is progressing well per protocol. No issues with upgrades in resistances    Plan:    Continue with POC    OP EDUCATION:   Added d2 flexion and 90/90 ER to HEP    Goals:   Active       LTG       Pt will be 100% IND with HEP in 8 weeks in order to maintain progress with therapy.   (Progressing)       Start:  12/27/24    Expected End:  02/25/25            Pt will demonstrate full R elbow AROM and strength in 8 weeks for return to work.  (Progressing)       Start:  12/27/24    Expected End:  02/25/25            Pt will demonstrate subjective improvement of ADLs and recreational activities through improved score of 70 on UEFS in 8  weeks for return to work.  (Progressing)       Start:  12/27/24    Expected End:  02/25/25               STG       Pt will be 50% IND with HEP in 4 weeks in order to progress with therapy.  (Progressing)       Start:  12/27/24    Expected End:  01/31/25            Pt will demonstrated improve AROM of R elbow as follows: 0-120 in 4 weeks to improve mobility required for dressing, bathing, cooking and cleaning.  (Progressing)       Start:  12/27/24    Expected End:  01/31/25            Pt will improve R Elbow strength to 4/5 in 4 weeks in order to improve strength required to lift objects at home including groceries and to improve cleaning tasks.   (Progressing)       Start:  12/27/24    Expected End:  01/31/25            Pt will demonstrate subjective improvement of ADLs and recreational activities through improved score of 50 on UEFS in 4 weeks for improved return to work.  (Met)       Start:  12/27/24    Expected End:  01/31/25    Resolved:  01/31/25

## 2025-03-10 ENCOUNTER — APPOINTMENT (OUTPATIENT)
Dept: PHYSICAL THERAPY | Facility: CLINIC | Age: 45
End: 2025-03-10
Payer: COMMERCIAL

## 2025-03-28 ENCOUNTER — TREATMENT (OUTPATIENT)
Dept: PHYSICAL THERAPY | Facility: CLINIC | Age: 45
End: 2025-03-28
Payer: COMMERCIAL

## 2025-03-28 DIAGNOSIS — M25.521 RIGHT ELBOW PAIN: ICD-10-CM

## 2025-03-28 DIAGNOSIS — S46.211D TRAUMATIC PARTIAL TEAR OF RIGHT BICEPS TENDON, SUBSEQUENT ENCOUNTER: ICD-10-CM

## 2025-03-28 PROCEDURE — 97110 THERAPEUTIC EXERCISES: CPT | Mod: GP | Performed by: PHYSICAL THERAPIST

## 2025-03-28 PROCEDURE — 97530 THERAPEUTIC ACTIVITIES: CPT | Mod: GP | Performed by: PHYSICAL THERAPIST

## 2025-03-28 NOTE — PROGRESS NOTES
"Physical Therapy Progress Note/Treatment    Patient Name: Gabriel Castro  MRN: [id number]  Today's Date: 3/28/2025  Time Calculation  Start Time: 1010  Stop Time: 1048  Time Calculation (min): 38 min  PT Therapeutic Procedures Time Entry  Therapeutic Exercise Time Entry: 25  Therapeutic Activity Time Entry: 13      Progress Note: 1/31/25-3/28/25    Insurance:  Visit number: 8 of 16  Authorization info: 02/19/2025: [facility] - Extension Approved  2x a week/ 6 weeks/ Dates 02/19/2025 - 4/5/2025/ DX Codes S46.11A/ See    Insurance Type: Payor: BANG / Plan: BANG SELF INSURED / Product Type: *No Product type* /     Current Problem   1. Traumatic partial tear of right biceps tendon, subsequent encounter  Follow Up In Physical Therapy      2. Right elbow pain  Follow Up In Physical Therapy          Subjective   General    Pt reports his elbow is doing well, he is still having numbness along thumb region. He is back to work without any problems. Will be seeing MD for follow up on 4/8.   Precautions:   R bicep distal rupture repair 11/27/24 - 16 weeks this date 3/28/25  Pain    0  Post Treatment Pain Level 0    Objective   UEFS:73/80    R wrist ROM WNL  R elbow ext 1=lacking 8  R elbow flexion 140   R : 80    R elbow MMT  5/5  R wrist MMT 5/5    R GH WNL  R GH MMT 5/5  Treatments:    Therapeutic activity:   Reassessment this date     Therapeutic exercise:   Bicep curl 3 way #5 10x3  Wrist curl #5 10x3   Wrist extention #5 10x3   Wrist ext stretch 30\"x3   Neutral body blade V 30\"x3   Eccentric bicep curl light blue TB 10x3   Green flexbar wringing motion 10x3     Assessment   Assessment:    Pt has made good progress since the start of therapy, full ROM and strength as returned, progressed exercises to weight training this date with noted fatigue, pt requesting continuation of therapy to focus on return to weighted exercises, PT in agreement, will request [facility] extension after next session.     Plan:    Continue " weight training  - request [facility] extension     OP EDUCATION:   Educated on findings this date     Goals:   Active       LTG       Pt will be 100% IND with HEP in 8 weeks in order to maintain progress with therapy.   (Progressing)       Start:  12/27/24    Expected End:  02/25/25            Pt will demonstrate full R elbow AROM and strength in 8 weeks for return to work.  (Progressing)       Start:  12/27/24    Expected End:  02/25/25            Pt will demonstrate subjective improvement of ADLs and recreational activities through improved score of 70 on UEFS in 8 weeks for return to work.  (Progressing)       Start:  12/27/24    Expected End:  02/25/25               STG       Pt will be 50% IND with HEP in 4 weeks in order to progress with therapy.  (Progressing)       Start:  12/27/24    Expected End:  01/31/25            Pt will demonstrated improve AROM of R elbow as follows: 0-120 in 4 weeks to improve mobility required for dressing, bathing, cooking and cleaning.  (Progressing)       Start:  12/27/24    Expected End:  01/31/25            Pt will improve R Elbow strength to 4/5 in 4 weeks in order to improve strength required to lift objects at home including groceries and to improve cleaning tasks.   (Progressing)       Start:  12/27/24    Expected End:  01/31/25            Pt will demonstrate subjective improvement of ADLs and recreational activities through improved score of 50 on UEFS in 4 weeks for improved return to work.  (Met)       Start:  12/27/24    Expected End:  01/31/25    Resolved:  01/31/25

## 2025-03-31 ENCOUNTER — TELEPHONE (OUTPATIENT)
Dept: PHYSICAL THERAPY | Facility: CLINIC | Age: 45
End: 2025-03-31
Payer: COMMERCIAL

## 2025-03-31 NOTE — TELEPHONE ENCOUNTER
Called and left voicemail for Svetlana Ann, [phone], to get a date extension on C9 for this patient.

## 2025-04-02 ENCOUNTER — DOCUMENTATION (OUTPATIENT)
Dept: PHYSICAL THERAPY | Facility: CLINIC | Age: 45
End: 2025-04-02
Payer: COMMERCIAL

## 2025-04-02 ENCOUNTER — APPOINTMENT (OUTPATIENT)
Dept: PHYSICAL THERAPY | Facility: CLINIC | Age: 45
End: 2025-04-02
Payer: COMMERCIAL

## 2025-04-02 NOTE — PROGRESS NOTES
Physical Therapy                 Therapy Communication Note    Patient Name: Gabriel Castro  MRN: [id number]  Department:   Room: Room/bed info not found  Today's Date: 4/2/2025     Discipline: Physical Therapy          Missed Visit Reason:      Missed Time: Cancel    Comment: No reason given

## 2025-04-22 ENCOUNTER — APPOINTMENT (OUTPATIENT)
Dept: PHYSICAL THERAPY | Facility: CLINIC | Age: 45
End: 2025-04-22
Payer: COMMERCIAL

## 2025-04-30 ENCOUNTER — DOCUMENTATION (OUTPATIENT)
Dept: PHYSICAL THERAPY | Facility: CLINIC | Age: 45
End: 2025-04-30
Payer: COMMERCIAL

## 2025-04-30 ENCOUNTER — APPOINTMENT (OUTPATIENT)
Dept: PHYSICAL THERAPY | Facility: CLINIC | Age: 45
End: 2025-04-30
Payer: COMMERCIAL

## 2025-04-30 NOTE — PROGRESS NOTES
Physical Therapy                 Therapy Communication Note    Patient Name: Gabriel Castro  MRN: [id number]  Department:   Room: Room/bed info not found  Today's Date: 4/30/2025     Discipline: Physical Therapy          Missed Visit Reason:      Missed Time: Cancel    Comment: [facility]

## 2025-06-17 ENCOUNTER — DOCUMENTATION (OUTPATIENT)
Dept: PHYSICAL THERAPY | Facility: CLINIC | Age: 45
End: 2025-06-17
Payer: COMMERCIAL

## 2025-06-17 DIAGNOSIS — S46.211D TRAUMATIC PARTIAL TEAR OF RIGHT BICEPS TENDON, SUBSEQUENT ENCOUNTER: Primary | ICD-10-CM

## 2025-06-17 DIAGNOSIS — S46.211A STRAIN OF MUSCLE, FASCIA AND TENDON OF OTHER PARTS OF BICEPS, RIGHT ARM, INITIAL ENCOUNTER: ICD-10-CM

## 2025-06-17 DIAGNOSIS — M25.521 RIGHT ELBOW PAIN: ICD-10-CM

## 2025-06-17 NOTE — PROGRESS NOTES
Physical Therapy    Discharge Summary    Name: Gabriel Castro  MRN: [id number]  Date: 6/17/2025    Discharge Information:   Date of discharge 6/17/2025  Date of last attended visit 3/25/25  Date of evaluation 12/27/24  Number of attended visits 8  Referred by Dr. Ureña  Referred for   1. Traumatic partial tear of right biceps tendon, subsequent encounter        2. Right elbow pain        3. Strain of muscle, fascia and tendon of other parts of biceps, right arm, initial encounter            Therapy Summary: Pt intermittently compliant with therapy, at last session did demonstrate overall functional return to prior level with only minor strength deficits, pt failed to continue with therapy.       Discharge Reason(s): Achieved all or the most significant goal(s)  Satisfied with progress, able to continue progress with HEP/self-management  Patient understands to contact therapist or physician for any needs or change in status  Attendance inconsistent    Goals:   Active       LTG       Pt will be 100% IND with HEP in 8 weeks in order to maintain progress with therapy.   (Progressing)       Start:  12/27/24    Expected End:  02/25/25            Pt will demonstrate full R elbow AROM and strength in 8 weeks for return to work.  (Progressing)       Start:  12/27/24    Expected End:  02/25/25            Pt will demonstrate subjective improvement of ADLs and recreational activities through improved score of 70 on UEFS in 8 weeks for return to work.  (Progressing)       Start:  12/27/24    Expected End:  02/25/25               STG       Pt will be 50% IND with HEP in 4 weeks in order to progress with therapy.  (Progressing)       Start:  12/27/24    Expected End:  01/31/25            Pt will demonstrated improve AROM of R elbow as follows: 0-120 in 4 weeks to improve mobility required for dressing, bathing, cooking and cleaning.  (Progressing)       Start:  12/27/24    Expected End:  01/31/25            Pt will improve R  Elbow strength to 4/5 in 4 weeks in order to improve strength required to lift objects at home including groceries and to improve cleaning tasks.   (Progressing)       Start:  12/27/24    Expected End:  01/31/25            Pt will demonstrate subjective improvement of ADLs and recreational activities through improved score of 50 on UEFS in 4 weeks for improved return to work.  (Met)       Start:  12/27/24    Expected End:  01/31/25    Resolved:  01/31/25

## (undated) DEVICE — GOWN, SURGICAL, SIRUS, NON REINFORCED, LARGE

## (undated) DEVICE — SUTURE, MONOCRYL, 4-0, 27 IN, PS-2, UNDYED

## (undated) DEVICE — SUTURE, FIBERWIRE 2, T-5 TAPER NEEDLE, 38"

## (undated) DEVICE — DRAPE KIT, MINI C-ARM

## (undated) DEVICE — SUTURE, VICRYL, 3-0, 27 IN, SH

## (undated) DEVICE — BANDAGE, ESMARK, 4 IN X 12 FT, LF

## (undated) DEVICE — SYRINGE, 10 CC, LUER LOCK

## (undated) DEVICE — SUTURE TAPE, 1.3MM 40IN, BLK/WH, W/TAPER NDL 36.6MM

## (undated) DEVICE — Device

## (undated) DEVICE — COVER, MAYO STAND, W/PAD, 23 IN, DISPOSABLE, PLASTIC, LF, STERILE

## (undated) DEVICE — DRESSING, NON-ADHERENT, CURAD, ABSORBENT, 3 X 8 IN, STERILE

## (undated) DEVICE — SOLUTION, IRRIGATION, X RX SODIUM CHL 0.9%, 1000ML BTL

## (undated) DEVICE — ADHESIVE, SKIN, DERMABOND ADVANCED, 15CM, PEN-STYLE

## (undated) DEVICE — TUBING, SUCTION, 6MM X 10, CLEAN N-COND

## (undated) DEVICE — BANDAGE, ELASTIC, 4 IN X 11 YDS, STERILE, LF

## (undated) DEVICE — APPLICATOR, CHLORAPREP, W/ORANGE TINT, 26ML

## (undated) DEVICE — SYSTEM, IMPLANT DELIVERY, BIOCOMPOSITE, DISTAL BICEPS REPAIR

## (undated) DEVICE — NEEDLE, HYPODERMIC, MONOJECT, 25 G X 1.5 IN, LUER LOCK HUB, RED

## (undated) DEVICE — DRAPE, SHEET, LARGE, 70 X 85IN, STERILE

## (undated) DEVICE — PADDING, UNDERCAST, WEBRIL, 4 IN X 4 YD, REG, NS

## (undated) DEVICE — GLOVE, SURGICAL, PROTEXIS PI BLUE W/NEUTHERA, 7.5, PF, LF

## (undated) DEVICE — SPONGE, GAUZE, 12 PLY, 4 X 4, STERILE, DISP